# Patient Record
Sex: FEMALE | Race: WHITE | NOT HISPANIC OR LATINO | Employment: UNEMPLOYED | URBAN - METROPOLITAN AREA
[De-identification: names, ages, dates, MRNs, and addresses within clinical notes are randomized per-mention and may not be internally consistent; named-entity substitution may affect disease eponyms.]

---

## 2024-11-06 ENCOUNTER — HOSPITAL ENCOUNTER (OUTPATIENT)
Dept: PHYSICAL THERAPY | Facility: HOSPITAL | Age: 34
Setting detail: THERAPIES SERIES
Discharge: HOME | End: 2024-11-06
Payer: MEDICAID

## 2024-11-06 ENCOUNTER — TRANSCRIBE ORDERS (OUTPATIENT)
Dept: PHYSICAL THERAPY | Facility: HOSPITAL | Age: 34
End: 2024-11-06
Payer: MEDICAID

## 2024-11-06 ENCOUNTER — PATIENT OUTREACH (OUTPATIENT)
Dept: ONCOLOGY | Facility: CLINIC | Age: 34
End: 2024-11-06
Payer: MEDICAID

## 2024-11-06 DIAGNOSIS — Z17.0 MALIGNANT NEOPLASM OF LEFT BREAST IN FEMALE, ESTROGEN RECEPTOR POSITIVE, UNSPECIFIED SITE OF BREAST: Primary | ICD-10-CM

## 2024-11-06 DIAGNOSIS — C50.912 MALIGNANT NEOPLASM OF LEFT BREAST IN FEMALE, ESTROGEN RECEPTOR POSITIVE, UNSPECIFIED SITE OF BREAST: Primary | ICD-10-CM

## 2024-11-06 DIAGNOSIS — C50.912 MALIGNANT NEOPLASM OF LEFT FEMALE BREAST, UNSPECIFIED ESTROGEN RECEPTOR STATUS, UNSPECIFIED SITE OF BREAST: Primary | ICD-10-CM

## 2024-11-06 PROCEDURE — 93702 BIS XTRACELL FLUID ANALYSIS: CPT

## 2024-11-06 PROCEDURE — 97162 PT EVAL MOD COMPLEX 30 MIN: CPT

## 2024-11-06 NOTE — THERAPY DISCHARGE NOTE
Outpatient Physical Therapy Lymphedema Initial Evaluation & Discharge  [facility]     Patient Name: Eliane Argueta  : 1990  MRN: [id number]  Today's Date: 2024      Visit Date: 2024    Visit Dx:    ICD-10-CM ICD-9-CM   1. Malignant neoplasm of left female breast, unspecified estrogen receptor status, unspecified site of breast  C50.912 174.9       There is no problem list on file for this patient.       No past medical history on file.     No past surgical history on file.         Lymphedema       Row Name 24 1200             Subjective Pain    Able to rate subjective pain? yes  -LF      Pre-Treatment Pain Level 3  -LF      Post-Treatment Pain Level 3  -LF         Lymphedema Assessment    Lymphedema Classification LUE:;at risk/stage 0  -LF      Lymphedema Surgery Comments surgery is TBD  -LF         General ROM    RT Upper Ext Rt Shoulder ABduction;Rt Shoulder Flexion;Rt Shoulder External Rotation;Rt Shoulder Internal Rotation  -LF      LT Upper Ext Lt Shoulder Flexion;Lt Shoulder External Rotation;Lt Shoulder Internal Rotation;Lt Shoulder ABduction  -LF      GENERAL ROM COMMENTS WFL wrist/hand  -LF         Right Upper Ext    Rt Shoulder Abduction AROM WFL  -LF      Rt Shoulder Flexion AROM WFL  -LF      Rt Shoulder External Rotation AROM WFL  -LF      Rt Shoulder Internal Rotation AROM WFL  -LF         Left Upper Ext    Lt Shoulder Abduction AROM WFL  -LF      Lt Shoulder Flexion AROM WFL  -LF      Lt Shoulder External Rotation AROM WFL  -LF      Lt Shoulder Internal Rotation AROM WFL  -LF      Lt Upper Extremity Comments  ~5 degree limitation shoulder flex compared to RUE - reports soreness at axilla from recent exams/palpation  -LF         MMT (Manual Muscle Testing)    Rt Upper Ext Rt Shoulder Flexion;Rt Shoulder ABduction;Rt Shoulder Internal Rotation;Rt Shoulder External Rotation  -LF      Lt Upper Ext Lt Shoulder Flexion;Lt Shoulder ABduction;Lt Shoulder Internal Rotation;Lt  Shoulder External Rotation  -LF         MMT Right Upper Ext    Rt Shoulder Flexion MMT, Gross Movement (4/5) good  -LF      Rt Shoulder ABduction MMT, Gross Movement (4/5) good  -LF      Rt Shoulder Internal Rotation MMT, Gross Movement (4/5) good  -LF      Rt Shoulder External Rotation MMT, Gross Movement (4/5) good  -LF         MMT Left Upper Ext    Lt Shoulder Flexion MMT, Gross Movement (4/5) good  -LF      Lt Shoulder ABduction MMT, Gross Movement (4/5) good  -LF      Lt Shoulder Internal Rotation MMT, Gross Movement (4/5) good  -LF      Lt Shoulder External Rotation MMT, Gross Movement (4/5) good  -LF         Hand  Strength     Strength Affected Side Bilateral  -LF          Strength Right    # Reps 1  -LF      Right Rung 2  -LF      Right  Test 1 15  -LF       Strength Average Right 15  -LF          Strength Left    # Reps 1  -LF      Left Rung 2  -LF      Left  Test 1 5  -LF       Strength Average Left 5  -LF         Lymphedema Edema Assessment    Edema Assessment Comment No edema present at this time.  -LF         Skin Changes/Observations    Location/Assessment Upper Extremity  -LF      Upper Extremity Conditions bilateral:;normal;intact  -LF      Upper Extremity Color/Pigment bilateral:;normal  -LF         Lymphedema Sensation    Lymphedema Sensation Reports RUE:;LUE:  -LF      Lymphedema Sensation Tests light touch  -LF      Lymphedema Light Touch RUE:;LUE:;WNL  -LF         L-Dex Bioimpedence Screening    L-Dex Measurement Extremity RUE;LUE  -LF      L-Dex Patient Position Standing  -LF      L-Dex UE Dominate Side Right  -LF      L-Dex UE At Risk Side Left  -LF      L-Dex UE Pre Surgical Value Yes  -LF      L-Dex UE Score -3.9  -LF      L-Dex UE Comment The patient had SOZO measurement which I reviewed today. The score is in normal limits, see scanned to EMR. Bioimpedance spectroscopy helps identify the onset of lymphedema in an arm or leg before patients experience  noticeable swelling. Research has shown that the early detection of lymphedema using L-Dex combined with treatment can reduce progression to chronic lymphedema by 95% in breast cancer patients. Whenever possible, patients are tested for baseline L-Dex score before cancer treatment begins and then are reassessed during regular follow-up visits using the SOZO device. Otherwise, this can be started postoperatively and continued during regular follow-up visits. If the patient’s L-Dex score increases above normal levels, that is a sign that lymphedema is developing and a referral is made to occupational or physical therapy for further evaluation and early compression treatment. Lymphedema assessment with the SOZO L-Dex score is recommended to be done every 3 months for the first 3 years and then every 6 months for years 4 and 5 followed by annually afterwards.  -LF                User Key  (r) = Recorded By, (t) = Taken By, (c) = Cosigned By      Initials Name Provider Type    Mercedes Webb PT Physical Therapist                        Therapy Education  Education Details: Pt educated on prehab evaluation assessments including bioimpedance. Pt was provided an HEP detailing information including lymphedema, risk reduction, and post op exercise.  Given: HEP, Symptoms/condition management, Posture/body mechanics  Program: New  How Provided: Verbal, Written  Provided to: Patient, Other (comment) (SO)  Level of Understanding: Verbalized     OP Exercises       Row Name 11/06/24 1200             Subjective    Subjective Comments Patient presents with recent diagnosis of L breast cancer.  Anticipated plan for surgery is bilateral mastectomy w/ SLNB and delayed reconstruction.  She has some chronic pain (mostly shoulder, neck, psine) due to RA.  She has 14 and 15 y/o children.  Enjoys reading.  -LF         Subjective Pain    Able to rate subjective pain? yes  -LF      Pre-Treatment Pain Level 3  -LF      Post-Treatment  Pain Level 3  -LF                User Key  (r) = Recorded By, (t) = Taken By, (c) = Cosigned By      Initials Name Provider Type    Mercedes Webb PT Physical Therapist                       PT OP Goals       Row Name 11/06/24 1200          Long Term Goals    LTG Date to Achieve 11/06/24  -LF     LTG 1 Pt demonstrates awareness of post-operative movement restrictions and HEP to facilitate lymphatic regeneration and reduce the risk of seroma formation, axillary web syndrome, and lymphedema while ensuring shoulder joint mobility.  -LF     LTG 1 Progress Met  -LF     LTG 2 Pt demonstrates understanding of post-operative basic lymphedema precautions  -LF     LTG 2 Progress Met  -LF        Time Calculation    PT Goal Re-Cert Due Date 11/06/24  -LF               User Key  (r) = Recorded By, (t) = Taken By, (c) = Cosigned By      Initials Name Provider Type    Merecdes Webb, PT Physical Therapist                     PT Assessment/Plan       Row Name 11/06/24 1200          PT Assessment    Assessment Comments This patient presents to PT pre-operatively for planned BrCA surgery scheduled in a couple weeks. Baseline ROM, postural, and bioimpedance measurements were taken today to be compared to measurements retaken 3-4 weeks post surgery. At that time, any reduced movement, decline in function, or postural issues will be addressed with skilled care and new goals will be established.  Personal risk factors for lymphedema post-operatively for the left upper extremity and trunk quadrant were also assessed today and basic lymphedema precautions were discussed. A more detailed discussion regarding personal lymphedema risk factors can take place post-operatively once the number of lymph nodes removed and the plan for further medical care is known.  -LF     Please refer to paper survey for additional self-reported information Yes  -LF     Rehab Potential Good  -LF     Patient/caregiver participated in establishment of  treatment plan and goals Yes  -LF     Patient would benefit from skilled therapy intervention Yes  -LF        PT Plan    PT Frequency One time visit  -     Planned CPT's? PT EVAL MOD COMPLELITY: 30931;PT BIS XTRACELL FLUID ANALYSIS: 91745  -     PT Plan Comments This patient may return to PT 3-4 weeks post operatively for re-evaluation measurements to be compared to measurements taken today, at her pre-operative evaluation. In addition, she can be examined for possible post-BrCA surgery sequelae such as axillary web syndrome, scar adhesions, edema, worsened posture, scapular winging, pain, and reduced ROM and function. At that time, a future plan and goals will be established and skilled care continued if indicated. Currently, she has been provided with information before BrCA surgery in order to facilitate recovery and reduce the risk of post-operative sequelae.  -               User Key  (r) = Recorded By, (t) = Taken By, (c) = Cosigned By      Initials Name Provider Type     Mercedes Juarez, PT Physical Therapist                     Outcome Measure Options: Quick DASH, Timed Up and Go (TUG)  Quick DASH  Open a tight or new jar.: Severe Difficulty  Do heavy household chores (e.g., wash walls, wash floors): Mild Difficulty  Carry a shopping bag or briefcase: Moderate Difficulty  Wash your back: Mild Difficulty  Use a knife to cut food: Mild Difficulty  Recreational activities in which you take some force or impact through your arm, should or hand (e.g. golf, hammering, tennis, etc.): Moderate Difficulty  During the past week, to what extent has your arm, shoulder, or hand problem interfered with your normal social activites with family, friends, neighbors or groups?: Moderately  During the past week, were you limited in your work or other regular daily activities as a result of your arm, shoulder or hand problem?: Slightly Limited  Arm, Shoulder, or hand pain: Moderate  Tingling (pins and needles) in your  arm, shoulder, or hand: Mild  During the past week, how much difficulty have you had sleeping because of the pain in your arm, shoulder or hand?: Moderate Difficiculty  Number of Questions Answered: 11  Quick DASH Score: 40.91  Timed Up and Go (TUG)  TUG Test 1: 9 seconds      Time Calculation:   Start Time: 1200  Untimed Charges  PT Eval/Re-eval Minutes: 45  Total Minutes  Untimed Charges Total Minutes: 45   Total Minutes: 45     Therapy Charges for Today       Code Description Service Date Service Provider Modifiers Qty    65720529329 HC PT BIS XTRACELL FLUID ANALYSIS 11/6/2024 Mercedes Juarez, PT  1    [id number] HC PT EVAL MOD COMPLEXITY 4 11/6/2024 Mercedes Juarez, PT GP 1            PT G-Codes  Outcome Measure Options: Quick DASH, Timed Up and Go (TUG)  Quick DASH Score: 40.91  TUG Test 1: 9 seconds            Mercedes Juarez, PT  11/6/2024

## 2024-11-07 ENCOUNTER — TRANSCRIBE ORDERS (OUTPATIENT)
Dept: ADMINISTRATIVE | Facility: HOSPITAL | Age: 34
End: 2024-11-07
Payer: MEDICAID

## 2024-11-07 DIAGNOSIS — C50.212 MALIGNANT NEOPLASM OF UPPER-INNER QUADRANT OF LEFT FEMALE BREAST, UNSPECIFIED ESTROGEN RECEPTOR STATUS: Primary | ICD-10-CM

## 2024-11-08 ENCOUNTER — PRE-ADMISSION TESTING (OUTPATIENT)
Dept: PREADMISSION TESTING | Facility: HOSPITAL | Age: 34
End: 2024-11-08
Payer: MEDICAID

## 2024-11-08 VITALS — HEIGHT: 67 IN | BODY MASS INDEX: 31.25 KG/M2 | WEIGHT: 199.08 LBS

## 2024-11-08 LAB
ALBUMIN SERPL-MCNC: 4.4 G/DL (ref 3.5–5.2)
ALBUMIN/GLOB SERPL: 1.6 G/DL
ALP SERPL-CCNC: 72 U/L (ref 39–117)
ALT SERPL W P-5'-P-CCNC: 13 U/L (ref 1–33)
ANION GAP SERPL CALCULATED.3IONS-SCNC: 11 MMOL/L (ref 5–15)
AST SERPL-CCNC: 16 U/L (ref 1–32)
BILIRUB SERPL-MCNC: 0.3 MG/DL (ref 0–1.2)
BUN SERPL-MCNC: 10 MG/DL (ref 6–20)
BUN/CREAT SERPL: 11.8 (ref 7–25)
CALCIUM SPEC-SCNC: 9.4 MG/DL (ref 8.6–10.5)
CHLORIDE SERPL-SCNC: 102 MMOL/L (ref 98–107)
CO2 SERPL-SCNC: 24 MMOL/L (ref 22–29)
CREAT SERPL-MCNC: 0.85 MG/DL (ref 0.57–1)
DEPRECATED RDW RBC AUTO: 40 FL (ref 37–54)
EGFRCR SERPLBLD CKD-EPI 2021: 91.8 ML/MIN/1.73
ERYTHROCYTE [DISTWIDTH] IN BLOOD BY AUTOMATED COUNT: 12.2 % (ref 12.3–15.4)
GLOBULIN UR ELPH-MCNC: 2.7 GM/DL
GLUCOSE SERPL-MCNC: 83 MG/DL (ref 65–99)
HBA1C MFR BLD: 5.2 % (ref 4.8–5.6)
HCT VFR BLD AUTO: 36.6 % (ref 34–46.6)
HGB BLD-MCNC: 12.3 G/DL (ref 12–15.9)
MCH RBC QN AUTO: 30.1 PG (ref 26.6–33)
MCHC RBC AUTO-ENTMCNC: 33.6 G/DL (ref 31.5–35.7)
MCV RBC AUTO: 89.5 FL (ref 79–97)
PLATELET # BLD AUTO: 277 10*3/MM3 (ref 140–450)
PMV BLD AUTO: 9.2 FL (ref 6–12)
POTASSIUM SERPL-SCNC: 4 MMOL/L (ref 3.5–5.2)
PROT SERPL-MCNC: 7.1 G/DL (ref 6–8.5)
RBC # BLD AUTO: 4.09 10*6/MM3 (ref 3.77–5.28)
SODIUM SERPL-SCNC: 137 MMOL/L (ref 136–145)
WBC NRBC COR # BLD AUTO: 7.02 10*3/MM3 (ref 3.4–10.8)

## 2024-11-08 PROCEDURE — 36415 COLL VENOUS BLD VENIPUNCTURE: CPT

## 2024-11-08 PROCEDURE — 85027 COMPLETE CBC AUTOMATED: CPT

## 2024-11-08 PROCEDURE — 80053 COMPREHEN METABOLIC PANEL: CPT

## 2024-11-08 PROCEDURE — 83036 HEMOGLOBIN GLYCOSYLATED A1C: CPT

## 2024-11-08 RX ORDER — NAPROXEN SODIUM 550 MG/1
TABLET ORAL
COMMUNITY
Start: 2024-09-06

## 2024-11-08 RX ORDER — DIAZEPAM 5 MG/1
TABLET ORAL
COMMUNITY

## 2024-11-08 NOTE — PROGRESS NOTES
I saw patient with Dr MCKAY and patient's . Dr MCKAY reviewed the pathology report and surgical/treatment options- The patient prefers bilateal mastoidectomies with Delayed reconstruction due to currently vaping. Educational and supportive materials were given and reviewed with patient - notes taken for and given to patient. The patient had genetic testing which was negative

## 2024-11-08 NOTE — PAT
An arrival time for procedure was not provided during PAT visit. If patient had any questions or concerns about their arrival time, they were instructed to contact their surgeon/physician.  Additionally, if the patient referred to an arrival time that was acquired from their my chart account, patient was encouraged to verify that time with their surgeon/physician. Arrival times are NOT provided in Pre Admission Testing Department.    Patient instructed to drink 20 ounces of Gatorade or Gatorlyte (if diabetic) and it needs to be completed 1 hour (for Main OR patients) or 2 hours (scheduled  section & [facility] patients) before given arrival time for procedure (NO RED Gatorade and NO Gatorade Zero).    Patient verbalized understanding.    Patient denies any current skin issues.     Patient to apply Chlorhexadine wipes  to surgical area (as instructed) the night before procedure and the AM of procedure. Wipes provided.    Patient viewed general PAT education video as instructed in their preoperative information received from their surgeon.  Patient stated the general PAT education video was viewed in its entirety and survey completed.  Copies of PAT general education handouts (Incentive Spirometry, Meds to Beds Program, Patient Belongings, Pre-op skin preparation instructions, Blood Glucose testing, Visitor policy, Surgery FAQ, Code H) distributed to patient if not printed. Education related to the PAT pass and skin preparation for surgery (if applicable) completed in PAT as a reinforcement to PAT education video. Patient instructed to return PAT pass provided today as well as completed skin preparation sheet (if applicable) on the day of procedure.     Additionally if patient had not viewed video yet but intended to view it at home or in our waiting area, then referred them to the handout with QR code/link provided during PAT visit.  Encouraged patient/family to read PAT general education handouts thoroughly and  notify PAT staff with any questions or concerns. Patient verbalized understanding of all information and priority content.

## 2024-11-11 ENCOUNTER — ANESTHESIA EVENT (OUTPATIENT)
Dept: PERIOP | Facility: HOSPITAL | Age: 34
End: 2024-11-11
Payer: MEDICAID

## 2024-11-11 ENCOUNTER — DOCUMENTATION (OUTPATIENT)
Dept: OTHER | Facility: HOSPITAL | Age: 34
End: 2024-11-11
Payer: MEDICAID

## 2024-11-11 RX ORDER — FAMOTIDINE 10 MG/ML
20 INJECTION, SOLUTION INTRAVENOUS ONCE
Status: CANCELLED | OUTPATIENT
Start: 2024-11-11 | End: 2024-11-11

## 2024-11-11 NOTE — PROGRESS NOTES
Distress Screening Follow-up    Name: Eliane Argueta    : 1990    Diagnosis: Breast Cancer    Location of Distress Screening: Breast Surgery     Distress Level: 8 (4 after consult with Dr. MCKAY 24) (2024 12:00 PM)    Physical Concerns:  Sleep: Y  Fatigue: Y  Memory or concentration: Y  Pain: Y    Emotional Concerns:  Worry or anxiety: Y  Sadness or depression: Y  Loss of interest or enjoyment: Y  Fear: Y  Anger: Y  Feelings of worthlessness or being a burden: Y    Social Concerns:  Relationship with spouse or partner: Y  Relationship with children: Y    Practical Concerns:  Taking care of others: Y  Work: Y  Housing: Y  Treatment decisions: Y     Interventions:   Practical Needs: Nurse Navigator Referral; Social Work Referral    Comments:  MICHAELA contacted pt to follow up on Distress Screen from recent visit. MICHAELA provided introductions and explained nature of the call. Pt communicated she is doing okay, and explained at this time they are just waiting to complete procedure tomorrow. Pt expressed some distress comes with fear of unknown after surgery. MICHAELA normalized her emotions and educated pt on role and resources. Pt thanked MICHAELA for the call, and stated she will reach out after surgery should needs arise. SW will be available ongoing.

## 2024-11-12 ENCOUNTER — ANESTHESIA (OUTPATIENT)
Dept: PERIOP | Facility: HOSPITAL | Age: 34
End: 2024-11-12
Payer: MEDICAID

## 2024-11-12 ENCOUNTER — HOSPITAL ENCOUNTER (OUTPATIENT)
Facility: HOSPITAL | Age: 34
Discharge: HOME | End: 2024-11-13
Attending: SURGERY | Admitting: SURGERY
Payer: MEDICAID

## 2024-11-12 ENCOUNTER — HOSPITAL ENCOUNTER (OUTPATIENT)
Dept: NUCLEAR MEDICINE | Facility: HOSPITAL | Age: 34
Discharge: HOME | End: 2024-11-12
Payer: MEDICAID

## 2024-11-12 ENCOUNTER — ANESTHESIA EVENT CONVERTED (OUTPATIENT)
Dept: ANESTHESIOLOGY | Facility: HOSPITAL | Age: 34
End: 2024-11-12
Payer: MEDICAID

## 2024-11-12 DIAGNOSIS — C50.212 MALIGNANT NEOPLASM OF UPPER-INNER QUADRANT OF LEFT FEMALE BREAST: Primary | ICD-10-CM

## 2024-11-12 DIAGNOSIS — C50.212 MALIGNANT NEOPLASM OF UPPER-INNER QUADRANT OF LEFT FEMALE BREAST, UNSPECIFIED ESTROGEN RECEPTOR STATUS: ICD-10-CM

## 2024-11-12 LAB
B-HCG UR QL: NEGATIVE
EXPIRATION DATE: NORMAL
INTERNAL NEGATIVE CONTROL: NEGATIVE
INTERNAL POSITIVE CONTROL: POSITIVE
Lab: NORMAL

## 2024-11-12 PROCEDURE — A9541 TC99M SULFUR COLLOID: HCPCS | Performed by: SURGERY

## 2024-11-12 PROCEDURE — 81025 URINE PREGNANCY TEST: CPT | Performed by: ANESTHESIOLOGY

## 2024-11-12 PROCEDURE — 25010000002 FENTANYL CITRATE (PF) 50 MCG/ML SOLUTION

## 2024-11-12 PROCEDURE — 25010000002 LIDOCAINE PF 1% 1 % SOLUTION: Performed by: ANESTHESIOLOGY

## 2024-11-12 PROCEDURE — 38792 RA TRACER ID OF SENTINL NODE: CPT

## 2024-11-12 PROCEDURE — 25010000002 FENTANYL CITRATE (PF) 100 MCG/2ML SOLUTION: Performed by: ANESTHESIOLOGY

## 2024-11-12 PROCEDURE — 25010000002 BUPIVACAINE (PF) 0.25 % SOLUTION: Performed by: ANESTHESIOLOGY

## 2024-11-12 PROCEDURE — 25010000002 HYDROMORPHONE 1 MG/ML SOLUTION

## 2024-11-12 PROCEDURE — 25010000002 ONDANSETRON PER 1 MG: Performed by: NURSE ANESTHETIST, CERTIFIED REGISTERED

## 2024-11-12 PROCEDURE — 25010000002 MIDAZOLAM PER 1 MG: Performed by: ANESTHESIOLOGY

## 2024-11-12 PROCEDURE — 25010000002 CEFAZOLIN PER 500 MG: Performed by: SURGERY

## 2024-11-12 PROCEDURE — 25010000002 DEXAMETHASONE SODIUM PHOSPHATE 10 MG/ML SOLUTION: Performed by: ANESTHESIOLOGY

## 2024-11-12 PROCEDURE — 88331 PATH CONSLTJ SURG 1 BLK 1SPC: CPT | Performed by: SURGERY

## 2024-11-12 PROCEDURE — 88342 IMHCHEM/IMCYTCHM 1ST ANTB: CPT | Performed by: SURGERY

## 2024-11-12 PROCEDURE — 25010000002 NEOSTIGMINE 10 MG/10ML SOLUTION: Performed by: NURSE ANESTHETIST, CERTIFIED REGISTERED

## 2024-11-12 PROCEDURE — 34310000005 TECHNETIUM FILTERED SULFUR COLLOID: Performed by: SURGERY

## 2024-11-12 PROCEDURE — 25010000002 GLYCOPYRROLATE 1 MG/5ML SOLUTION: Performed by: NURSE ANESTHETIST, CERTIFIED REGISTERED

## 2024-11-12 PROCEDURE — 25010000002 ONDANSETRON PER 1 MG

## 2024-11-12 PROCEDURE — 25010000002 HYDROMORPHONE 1 MG/ML SOLUTION: Performed by: SURGERY

## 2024-11-12 PROCEDURE — 25810000003 LACTATED RINGERS PER 1000 ML: Performed by: ANESTHESIOLOGY

## 2024-11-12 PROCEDURE — 88307 TISSUE EXAM BY PATHOLOGIST: CPT | Performed by: SURGERY

## 2024-11-12 PROCEDURE — 25010000002 PROPOFOL 10 MG/ML EMULSION: Performed by: ANESTHESIOLOGY

## 2024-11-12 RX ORDER — DIAZEPAM 2 MG/1
2 TABLET ORAL EVERY 8 HOURS PRN
Status: DISCONTINUED | OUTPATIENT
Start: 2024-11-12 | End: 2024-11-13 | Stop reason: HOSPADM

## 2024-11-12 RX ORDER — SODIUM CHLORIDE 0.9 % (FLUSH) 0.9 %
10 SYRINGE (ML) INJECTION EVERY 12 HOURS SCHEDULED
Status: DISCONTINUED | OUTPATIENT
Start: 2024-11-12 | End: 2024-11-12 | Stop reason: HOSPADM

## 2024-11-12 RX ORDER — PREGABALIN 75 MG/1
75 CAPSULE ORAL ONCE
Status: COMPLETED | OUTPATIENT
Start: 2024-11-12 | End: 2024-11-12

## 2024-11-12 RX ORDER — LORAZEPAM 0.5 MG/1
0.5 TABLET ORAL EVERY 8 HOURS PRN
Status: DISCONTINUED | OUTPATIENT
Start: 2024-11-12 | End: 2024-11-13 | Stop reason: HOSPADM

## 2024-11-12 RX ORDER — SODIUM CHLORIDE 0.9 % (FLUSH) 0.9 %
10 SYRINGE (ML) INJECTION AS NEEDED
Status: DISCONTINUED | OUTPATIENT
Start: 2024-11-12 | End: 2024-11-12 | Stop reason: HOSPADM

## 2024-11-12 RX ORDER — GLYCOPYRROLATE 0.2 MG/ML
INJECTION INTRAMUSCULAR; INTRAVENOUS AS NEEDED
Status: DISCONTINUED | OUTPATIENT
Start: 2024-11-12 | End: 2024-11-12 | Stop reason: SURG

## 2024-11-12 RX ORDER — FENTANYL CITRATE 50 UG/ML
INJECTION, SOLUTION INTRAMUSCULAR; INTRAVENOUS
Status: COMPLETED
Start: 2024-11-12 | End: 2024-11-12

## 2024-11-12 RX ORDER — FENTANYL CITRATE 50 UG/ML
INJECTION, SOLUTION INTRAMUSCULAR; INTRAVENOUS AS NEEDED
Status: DISCONTINUED | OUTPATIENT
Start: 2024-11-12 | End: 2024-11-12 | Stop reason: SURG

## 2024-11-12 RX ORDER — NALOXONE HCL 0.4 MG/ML
0.1 VIAL (ML) INJECTION
Status: DISCONTINUED | OUTPATIENT
Start: 2024-11-12 | End: 2024-11-13 | Stop reason: HOSPADM

## 2024-11-12 RX ORDER — DROPERIDOL 2.5 MG/ML
0.62 INJECTION, SOLUTION INTRAMUSCULAR; INTRAVENOUS ONCE AS NEEDED
Status: DISCONTINUED | OUTPATIENT
Start: 2024-11-12 | End: 2024-11-12 | Stop reason: HOSPADM

## 2024-11-12 RX ORDER — BUPIVACAINE HYDROCHLORIDE 2.5 MG/ML
INJECTION, SOLUTION EPIDURAL; INFILTRATION; INTRACAUDAL
Status: COMPLETED | OUTPATIENT
Start: 2024-11-12 | End: 2024-11-12

## 2024-11-12 RX ORDER — FENTANYL CITRATE 50 UG/ML
50 INJECTION, SOLUTION INTRAMUSCULAR; INTRAVENOUS
Status: DISCONTINUED | OUTPATIENT
Start: 2024-11-12 | End: 2024-11-12 | Stop reason: HOSPADM

## 2024-11-12 RX ORDER — PROPOFOL 10 MG/ML
VIAL (ML) INTRAVENOUS AS NEEDED
Status: DISCONTINUED | OUTPATIENT
Start: 2024-11-12 | End: 2024-11-12 | Stop reason: SURG

## 2024-11-12 RX ORDER — LIDOCAINE HYDROCHLORIDE 10 MG/ML
0.5 INJECTION, SOLUTION EPIDURAL; INFILTRATION; INTRACAUDAL; PERINEURAL ONCE AS NEEDED
Status: DISCONTINUED | OUTPATIENT
Start: 2024-11-12 | End: 2024-11-12 | Stop reason: HOSPADM

## 2024-11-12 RX ORDER — NEOSTIGMINE METHYLSULFATE 1 MG/ML
INJECTION INTRAVENOUS AS NEEDED
Status: DISCONTINUED | OUTPATIENT
Start: 2024-11-12 | End: 2024-11-12 | Stop reason: SURG

## 2024-11-12 RX ORDER — OXYCODONE HYDROCHLORIDE 5 MG/1
5 TABLET ORAL EVERY 4 HOURS PRN
Status: DISCONTINUED | OUTPATIENT
Start: 2024-11-12 | End: 2024-11-13 | Stop reason: HOSPADM

## 2024-11-12 RX ORDER — ONDANSETRON 2 MG/ML
INJECTION INTRAMUSCULAR; INTRAVENOUS
Status: COMPLETED
Start: 2024-11-12 | End: 2024-11-12

## 2024-11-12 RX ORDER — SCOLOPAMINE TRANSDERMAL SYSTEM 1 MG/1
1 PATCH, EXTENDED RELEASE TRANSDERMAL ONCE
Status: DISCONTINUED | OUTPATIENT
Start: 2024-11-12 | End: 2024-11-13 | Stop reason: HOSPADM

## 2024-11-12 RX ORDER — PROMETHAZINE HYDROCHLORIDE 12.5 MG/1
6.25 SUPPOSITORY RECTAL EVERY 6 HOURS PRN
Status: DISCONTINUED | OUTPATIENT
Start: 2024-11-12 | End: 2024-11-13 | Stop reason: HOSPADM

## 2024-11-12 RX ORDER — MELOXICAM 15 MG/1
15 TABLET ORAL DAILY
Status: DISCONTINUED | OUTPATIENT
Start: 2024-11-13 | End: 2024-11-13 | Stop reason: HOSPADM

## 2024-11-12 RX ORDER — ONDANSETRON 2 MG/ML
4 INJECTION INTRAMUSCULAR; INTRAVENOUS ONCE AS NEEDED
Status: COMPLETED | OUTPATIENT
Start: 2024-11-12 | End: 2024-11-12

## 2024-11-12 RX ORDER — ACETAMINOPHEN 500 MG
1000 TABLET ORAL EVERY 6 HOURS
Status: DISCONTINUED | OUTPATIENT
Start: 2024-11-12 | End: 2024-11-13 | Stop reason: HOSPADM

## 2024-11-12 RX ORDER — LIDOCAINE HYDROCHLORIDE 10 MG/ML
INJECTION, SOLUTION EPIDURAL; INFILTRATION; INTRACAUDAL; PERINEURAL AS NEEDED
Status: DISCONTINUED | OUTPATIENT
Start: 2024-11-12 | End: 2024-11-12 | Stop reason: SURG

## 2024-11-12 RX ORDER — DEXAMETHASONE SODIUM PHOSPHATE 10 MG/ML
INJECTION, SOLUTION INTRAMUSCULAR; INTRAVENOUS AS NEEDED
Status: DISCONTINUED | OUTPATIENT
Start: 2024-11-12 | End: 2024-11-12

## 2024-11-12 RX ORDER — DIPHENHYDRAMINE HYDROCHLORIDE 50 MG/ML
12.5 INJECTION INTRAMUSCULAR; INTRAVENOUS EVERY 6 HOURS PRN
Status: DISCONTINUED | OUTPATIENT
Start: 2024-11-12 | End: 2024-11-13 | Stop reason: HOSPADM

## 2024-11-12 RX ORDER — ONDANSETRON 2 MG/ML
4 INJECTION INTRAMUSCULAR; INTRAVENOUS EVERY 6 HOURS PRN
Status: DISCONTINUED | OUTPATIENT
Start: 2024-11-12 | End: 2024-11-13 | Stop reason: HOSPADM

## 2024-11-12 RX ORDER — ACETAMINOPHEN 500 MG
1000 TABLET ORAL ONCE
Status: COMPLETED | OUTPATIENT
Start: 2024-11-12 | End: 2024-11-12

## 2024-11-12 RX ORDER — ROCURONIUM BROMIDE 10 MG/ML
INJECTION, SOLUTION INTRAVENOUS AS NEEDED
Status: DISCONTINUED | OUTPATIENT
Start: 2024-11-12 | End: 2024-11-12 | Stop reason: SURG

## 2024-11-12 RX ORDER — FAMOTIDINE 20 MG/1
20 TABLET, FILM COATED ORAL ONCE
Status: COMPLETED | OUTPATIENT
Start: 2024-11-12 | End: 2024-11-12

## 2024-11-12 RX ORDER — SODIUM CHLORIDE 9 MG/ML
50 INJECTION, SOLUTION INTRAVENOUS CONTINUOUS
Status: ACTIVE | OUTPATIENT
Start: 2024-11-12 | End: 2024-11-12

## 2024-11-12 RX ORDER — MIDAZOLAM HYDROCHLORIDE 1 MG/ML
1 INJECTION, SOLUTION INTRAMUSCULAR; INTRAVENOUS
Status: DISCONTINUED | OUTPATIENT
Start: 2024-11-12 | End: 2024-11-12 | Stop reason: HOSPADM

## 2024-11-12 RX ORDER — PROMETHAZINE HYDROCHLORIDE 12.5 MG/1
6.25 TABLET ORAL EVERY 6 HOURS PRN
Status: DISCONTINUED | OUTPATIENT
Start: 2024-11-12 | End: 2024-11-13 | Stop reason: HOSPADM

## 2024-11-12 RX ORDER — SODIUM CHLORIDE, SODIUM LACTATE, POTASSIUM CHLORIDE, CALCIUM CHLORIDE 600; 310; 30; 20 MG/100ML; MG/100ML; MG/100ML; MG/100ML
9 INJECTION, SOLUTION INTRAVENOUS CONTINUOUS
Status: ACTIVE | OUTPATIENT
Start: 2024-11-13 | End: 2024-11-13

## 2024-11-12 RX ORDER — MELOXICAM 15 MG/1
15 TABLET ORAL ONCE
Status: COMPLETED | OUTPATIENT
Start: 2024-11-12 | End: 2024-11-12

## 2024-11-12 RX ORDER — ONDANSETRON 2 MG/ML
INJECTION INTRAMUSCULAR; INTRAVENOUS AS NEEDED
Status: DISCONTINUED | OUTPATIENT
Start: 2024-11-12 | End: 2024-11-12 | Stop reason: SURG

## 2024-11-12 RX ORDER — HYDROMORPHONE HYDROCHLORIDE 1 MG/ML
0.5 INJECTION, SOLUTION INTRAMUSCULAR; INTRAVENOUS; SUBCUTANEOUS
Status: DISCONTINUED | OUTPATIENT
Start: 2024-11-12 | End: 2024-11-12 | Stop reason: HOSPADM

## 2024-11-12 RX ORDER — DEXAMETHASONE SODIUM PHOSPHATE 10 MG/ML
INJECTION, SOLUTION INTRAMUSCULAR; INTRAVENOUS
Status: COMPLETED | OUTPATIENT
Start: 2024-11-12 | End: 2024-11-12

## 2024-11-12 RX ADMIN — SODIUM CHLORIDE 2000 MG: 900 INJECTION INTRAVENOUS at 17:48

## 2024-11-12 RX ADMIN — FENTANYL CITRATE 100 MCG: 50 INJECTION, SOLUTION INTRAMUSCULAR; INTRAVENOUS at 07:30

## 2024-11-12 RX ADMIN — BUPIVACAINE HYDROCHLORIDE 60 ML: 2.5 INJECTION, SOLUTION EPIDURAL; INFILTRATION; INTRACAUDAL; PERINEURAL at 07:35

## 2024-11-12 RX ADMIN — OXYCODONE 5 MG: 5 TABLET ORAL at 21:30

## 2024-11-12 RX ADMIN — FENTANYL CITRATE 50 MCG: 50 INJECTION, SOLUTION INTRAMUSCULAR; INTRAVENOUS at 10:46

## 2024-11-12 RX ADMIN — OXYCODONE 5 MG: 5 TABLET ORAL at 17:07

## 2024-11-12 RX ADMIN — SCOPOLAMINE 1 PATCH: 1.5 PATCH, EXTENDED RELEASE TRANSDERMAL at 06:45

## 2024-11-12 RX ADMIN — TECHNETIUM TC 99M SULFUR COLLOID 1 DOSE: KIT at 07:35

## 2024-11-12 RX ADMIN — HYDROMORPHONE HYDROCHLORIDE 0.5 MG: 1 INJECTION, SOLUTION INTRAMUSCULAR; INTRAVENOUS; SUBCUTANEOUS at 11:23

## 2024-11-12 RX ADMIN — GLYCOPYRROLATE 0.6 MG: 0.2 INJECTION INTRAMUSCULAR; INTRAVENOUS at 10:13

## 2024-11-12 RX ADMIN — DEXAMETHASONE SODIUM PHOSPHATE 6 MG: 10 INJECTION, SOLUTION INTRAMUSCULAR; INTRAVENOUS at 07:40

## 2024-11-12 RX ADMIN — LIDOCAINE HYDROCHLORIDE 50 MG: 10 INJECTION, SOLUTION EPIDURAL; INFILTRATION; INTRACAUDAL; PERINEURAL at 07:33

## 2024-11-12 RX ADMIN — ONDANSETRON 4 MG: 2 INJECTION INTRAMUSCULAR; INTRAVENOUS at 10:51

## 2024-11-12 RX ADMIN — FENTANYL CITRATE 50 MCG: 50 INJECTION, SOLUTION INTRAMUSCULAR; INTRAVENOUS at 11:02

## 2024-11-12 RX ADMIN — SODIUM CHLORIDE, POTASSIUM CHLORIDE, SODIUM LACTATE AND CALCIUM CHLORIDE: 600; 310; 30; 20 INJECTION, SOLUTION INTRAVENOUS at 07:29

## 2024-11-12 RX ADMIN — PREGABALIN 75 MG: 75 CAPSULE ORAL at 06:45

## 2024-11-12 RX ADMIN — PROPOFOL 50 MG: 10 INJECTION, EMULSION INTRAVENOUS at 07:34

## 2024-11-12 RX ADMIN — ROCURONIUM BROMIDE 30 MG: 10 INJECTION INTRAVENOUS at 07:33

## 2024-11-12 RX ADMIN — PROPOFOL 25 MCG/KG/MIN: 10 INJECTION, EMULSION INTRAVENOUS at 07:40

## 2024-11-12 RX ADMIN — ACETAMINOPHEN 1000 MG: 500 TABLET ORAL at 06:45

## 2024-11-12 RX ADMIN — PROPOFOL 200 MG: 10 INJECTION, EMULSION INTRAVENOUS at 07:33

## 2024-11-12 RX ADMIN — HYDROMORPHONE HYDROCHLORIDE 0.3 MG: 1 INJECTION, SOLUTION INTRAMUSCULAR; INTRAVENOUS; SUBCUTANEOUS at 11:56

## 2024-11-12 RX ADMIN — ACETAMINOPHEN 1000 MG: 500 TABLET ORAL at 13:16

## 2024-11-12 RX ADMIN — OXYCODONE 5 MG: 5 TABLET ORAL at 11:56

## 2024-11-12 RX ADMIN — MELOXICAM 15 MG: 15 TABLET ORAL at 06:45

## 2024-11-12 RX ADMIN — DEXAMETHASONE SODIUM PHOSPHATE 4 MG: 10 INJECTION, SOLUTION INTRAMUSCULAR; INTRAVENOUS at 07:35

## 2024-11-12 RX ADMIN — FAMOTIDINE 20 MG: 20 TABLET, FILM COATED ORAL at 06:45

## 2024-11-12 RX ADMIN — ONDANSETRON 4 MG: 2 INJECTION INTRAMUSCULAR; INTRAVENOUS at 10:01

## 2024-11-12 RX ADMIN — NEOSTIGMINE 5 MG: 1 INJECTION INTRAVENOUS at 10:13

## 2024-11-12 RX ADMIN — ACETAMINOPHEN 1000 MG: 500 TABLET ORAL at 19:46

## 2024-11-12 RX ADMIN — MIDAZOLAM HYDROCHLORIDE 2 MG: 1 INJECTION, SOLUTION INTRAMUSCULAR; INTRAVENOUS at 06:46

## 2024-11-12 RX ADMIN — SODIUM CHLORIDE 2000 MG: 900 INJECTION INTRAVENOUS at 07:39

## 2024-11-12 NOTE — ANESTHESIA PROCEDURE NOTES
Peripheral Block    Pre-sedation assessment completed: 11/12/2024 7:34 AM    Patient reassessed immediately prior to procedure    Patient location during procedure: OR  Start time: 11/12/2024 7:35 AM  Reason for block: at surgeon's request and post-op pain management  Performed by  Anesthesiologist: Darek Capellan MD  Preanesthetic Checklist  Completed: patient identified, IV checked, site marked, risks and benefits discussed, surgical consent, monitors and equipment checked, pre-op evaluation and timeout performed  Prep:  Pt Position: supine  Sterile barriers:cap, gloves, mask and washed/disinfected hands  Prep: ChloraPrep  Patient monitoring: blood pressure monitoring, continuous pulse oximetry and EKG  Procedure  Performed under: general  Guidance:ultrasound guided and landmark technique  Images:still images obtained, printed/placed on chart    Laterality:Bilateral  Block Type:PECS I and PECS II  Injection Technique:single-shot  Needle Type:short-bevel  Needle Gauge:20 G  Resistance on Injection: none    Medications Used: bupivacaine PF (MARCAINE) 0.25 % injection - Injection   60 mL - 11/12/2024 7:35:00 AM  dexamethasone sodium phosphate injection - Injection   4 mg - 11/12/2024 7:35:00 AM      Medications  Preservative Free Saline:10ml  Comment:Block Injection:  Total volume of LA divided between Right and Left sided blocks         Post Assessment  Injection Assessment: negative aspiration for heme, incremental injection and no paresthesia on injection  Patient Tolerance:comfortable throughout block  Complications:no  Additional Notes  Interpectoral-Pectoserratus Plane   A high-frequency linear transducer, with sterile cover, was placed medial to the coracoid process in the paramedian sagittal plane. The transducer was moved caudally to the 4th rib and rotated slightly to allow an in-plane needle trajectory from medial to lateral. Pectoralis Major Muscle (PMM), Pectoralis Minor Muscle (PmM), Thoracoacromial  "Artery, Ribs, and Pleura were identified under ultrasound. The insertion site was prepped in sterile fashion and then localized with 2-5 ml of 1% Lidocaine. Using ultrasound-guidance, a 20-gauge B-Preciado 4\" Ultraplex 360 non-stimulating echogenic needle was advanced in plane until the tip of the needle was in the fascial plane between the PMM and PmM, lateral to the Thoracoacromial Artery. 1-3ml of preservative free normal saline was used to hydro-dissect the fascial planes. After the fascial plane was verified, 10ml local anesthetic (LA) was injected for Interpectoral fascial plane block. The needle was continued along the same path to the level of the 4th rib below PmM.  Initially preservative free normal saline was used to confirm needle position and then 20 ml of LA was injected for Pectoserratus fascial plane block. Aspiration every 5 ml to prevent intravascular injection. Injection was completed with negative aspiration of blood and negative intravascular injection. Injection pressures were normal with minimal resistance.     Performed by: Darek Capellan MD            "

## 2024-11-12 NOTE — INTERVAL H&P NOTE
"Pre-Op H&P (See Recent Office Note Attached for Full H&P)    History and physical note from office reviewed and updated with the following, otherwise there are no changes in H&P:      Review of Systems:  General ROS:  no fever, chills, rashes.  No change since last office visit.  No recent sick exposure  Cardiovascular ROS: no chest pain or dyspnea on exertion  Respiratory ROS: no cough, shortness of breath, or wheezing    Immunization History:   Influenza:  no   Pneumococcal:  no  Tetanus: up-to-date    Meds:    No current facility-administered medications on file prior to encounter.     No current outpatient medications on file prior to encounter.       Vital Signs:  /67 (BP Location: Right arm, Patient Position: Lying)   Pulse 74   Temp 98.2 °F (36.8 °C) (Temporal)   Resp 16   Ht 170.2 cm (67\")   Wt 90.3 kg (199 lb 1.2 oz)   LMP 11/03/2024   SpO2 100%   BMI 31.18 kg/m²     Physical Exam:    CV:  S1S2 regular rate and rhythm, no murmur               Resp:  Clear to auscultation; respirations regular, even and unlabored    Results Review:     Lab Results   Component Value Date    WBC 7.02 11/08/2024    HGB 12.3 11/08/2024    HCT 36.6 11/08/2024    MCV 89.5 11/08/2024     11/08/2024    GLUCOSE 83 11/08/2024    BUN 10 11/08/2024    CREATININE 0.85 11/08/2024     11/08/2024    K 4.0 11/08/2024     11/08/2024    CO2 24.0 11/08/2024    CALCIUM 9.4 11/08/2024    ALBUMIN 4.4 11/08/2024    AST 16 11/08/2024    ALT 13 11/08/2024    BILITOT 0.3 11/08/2024   I reviewed the patient's new clinical results.    Cancer Staging (if applicable)  Cancer Patient: left breast cancer - Clinical stage IIA  (T2, N0, M0) 37mm grade 3 IDC at 11o'clock,  ER/AR positive,  HER2 negative     Assessment/Plan:  Malignant Neoplasm of upper inner quadrant left female breast  /  LEFT SKIN SPARING MASTECTOMY, LEFT SENTINEL NODE BIOPSY, PROPHYLACTIC RIGHT SKIN SPARING MASTECTOMY       VALDO Shah   11/12/2024 "   07:05 EST

## 2024-11-12 NOTE — ADDENDUM NOTE
Addendum  created 11/12/24 1130 by Ann Vernon CRNA    Clinical Note Signed, Diagnosis association updated, Intraprocedure Blocks edited

## 2024-11-12 NOTE — OP NOTE
Operative Note    Eliane Argueta  [id number]   1990     Date of Surgery:  11/12/2024    Pre-Operative Diagnosis: Left breast cancer in the upper inner quadrant    Post-Operative Diagnosis: Left breast cancer in the upper inner quadrant    Procedure: Left sentinel lymph node injection                      Left skin sparing mastectomy                      Left deep subfascial sentinel lymph node biopsy                      Left axillary lymph node dissection                      Prophylactic right skin sparing mastectomy    Anesthesia:  General     [place] Node Biopsy for Breast Cancer - Left  Operation performed with curative intent. Yes   Tracer(s) used to identify sentinel nodes in the upfront surgery (non-neoadjuvant) setting (select all that apply). Radioactive tracer   Tracer(s) used to identify sentinel nodes in the neoadjuvant setting (select all that apply). N/A   All nodes (colored or non-colored) present at the end of a dye-filled lymphatic channel were removed. N/A   All significantly radioactive nodes were removed. Yes   All palpably suspicious nodes were removed. N/A   Biopsy-proven positive nodes marked with clips prior to chemotherapy were identified and removed. N/A    and Axillary Lymph Node Dissection for Breast Cancer - Left  Operation performed with curative intent. Yes   Resection was performed within the boundaries of the axillary vein, chest wall (serratus anterior), and latissimus dorsi. Yes   Nerves identified and preserved during dissection (select all that apply). Long thoracic nerve, Thoracodorsal nerve, and Branches of the intercostobrachial nerves   Level III nodes were removed. No             Surgeon:  Hannah Rooney MD    Circulator: Trudy Faye RN; Bette Vega RN  Scrub Person: Lavonne Black; Marisela Lancaster  Nursing Assistant: Noemi Mitchell PCT  Assistant: Geri Mays PA     Assistant: Geri Mays PA    Estimated Blood Loss: Very  minimal    Findings: Left sentinel lymph node positive for macrometastases    Complications: None      Indication for Procedure: Mrs. Argueta is a healthy 34-year-old lady who presented with a palpable left breast mass at the 11 o'clock position with workup remarkable for the 37 mm invasive ductal carcinoma that is high-grade.  She presents today for the above procedure with plan to have delayed reconstruction.    Procedure: Patient was taken to the operating room by anesthesia placed supine on the table.  Following induction of general endotracheal esthesia, SCDs were placed.  She received 2 g of Kefzol IV.  Anesthesia placed bilateral ultrasound-guided pectoralis nerve block.  550 mCi of radioactive technetium was injected in the left subareolar region.  The breasts, chest, axillas and upper arms were then prepped and draped in a sterile fashion.  Timeout was observed.  We proceeded with the prophylactic right skin sparing mastectomy which was done via a transverse elliptical incision, encompassing the areola and nipple complex.  A superior then inferior flaps were raised dissection the breast tissue away from the flaps down to where the muscle was exposed, maintaining adequate thickness of the flaps.  The breast was then removed off of the underlying pectoralis major muscle taking the fascia along with the specimen.  Larger vessels of the breast were ligated with 3-0 silk suture.  The breast was removed as a whole, marked with a silk suture laterally, weighed and sent to pathology for permanent section.  The wound was irrigated with warm water with clear return of fluid noted.  We then proceeded with the left skin sparing mastectomy which was done in a similar fashion.  Once in the axilla we identified a deep subfascial sentinel lymph node that had a very high radioactive count.  This was excised and sent to pathology for frozen section and noted to be positive for macrometastases.  The breast was also removed as a  whole, marked with a silk suture laterally, weighed and sent to pathology for permanent section.  We then proceeded with the actual lymph node dissection were actual lymph nodes were excised, preserving the long thoracic, thoracodorsal as well as the intercostal brachial nerves.  These were sent to pathology for permanent section.  Following adequate hemostasis, the wound was irrigated with water with clear return of fluid noted.  15 [place] round Gianni-Shane drains were then placed percutaneously on either side and anchored to the skin with 2-0 silk suture.  The subcutaneous tissues were approximated with interrupted 3-0 Vicryl sutures and the skin incisions were covered with Exofin fusion Premiere dressings.  ABDs with a Ace bandage were then applied.  The patient tolerated the procedure well with no complications.  She was extubated and taken to the recovery room in a stable condition.  Sponge count and needle count were correct at the end of the procedure.    Assistant: Geri Mays PA  was responsible for performing the following activities: Retraction, Suction, Closing, and Placing Dressing and their skilled assistance was necessary for the success of this case.      Hannah Rooney MD  11/12/24  10:31 EST

## 2024-11-12 NOTE — ANESTHESIA PROCEDURE NOTES
Airway  Urgency: elective    Date/Time: 11/12/2024 7:38 AM  Airway not difficult    General Information and Staff    Patient location during procedure: OR  CRNA/CAA: Ann Vernon CRNA  SRNA: Nata Smith SRNA  Indications and Patient Condition  Indications for airway management: airway protection    Preoxygenated: yes  MILS not maintained throughout  Mask difficulty assessment: 2 - vent by mask + OA or adjuvant +/- NMBA    Final Airway Details  Final airway type: endotracheal airway      Successful airway: ETT  Cuffed: yes   Successful intubation technique: video laryngoscopy  Endotracheal tube insertion site: oral  Blade: Rogers  Blade size: 3  ETT size (mm): 7.0  Cormack-Lehane Classification: grade I - full view of glottis  Placement verified by: chest auscultation and capnometry   Measured from: lips  ETT/EBT  to lips (cm): 21  Number of attempts at approach: 2  Assessment: lips, teeth, and gum same as pre-op and atraumatic intubation    Additional Comments  Negative epigastric sounds, Breath sound equal bilaterally with symmetric chest rise and fall

## 2024-11-12 NOTE — BRIEF OP NOTE
BREAST MASTECTOMY WITH SENTINEL NODE BIOPSY BILATERAL  Progress Note    Eliane Argueta  11/12/2024    Pre-op Diagnosis:   Left breast cancer in the upper inner quadrant       Post-Op Diagnosis Codes:     * Malignant neoplasm of upper-inner quadrant of left female breast [C50.212]    Procedure/CPT® Codes:        Procedure(s):  LEFT SKIN SPARING MASTECTOMY, LEFT SENTINEL NODE BIOPSY, PROPHYLACTIC RIGHT SKIN SPARING MASTECTOMY, LEFT AXILLARY NODE DISSECTION              Surgeon(s):  Hannah Rooney MD    Anesthesia: General    Staff:   Circulator: Trudy Faye RN; Bette Vega RN  Scrub Person: Lavonne Black; Marisela Lancaster  Nursing Assistant: Noemi Mitchell PCT  Assistant: Geri Mays PA  Assistant: Geri Mays PA      Estimated Blood Loss: minimal    Urine Voided: * No values recorded between 11/12/2024  7:29 AM and 11/12/2024 10:25 AM *    Specimens:                Specimens       ID Source Type Tests Collected By Collected At Frozen?    A Breast, Right Tissue TISSUE PATHOLOGY EXAM   Hannah Rooney MD 11/12/24 0843 No    Description: stitch is lateral    B [place] Lymph Node Tissue TISSUE PATHOLOGY EXAM   Hannah Rooney MD 11/12/24 0918 Yes    Description: left sentinel node    C Breast, Left Tissue TISSUE PATHOLOGY EXAM   Hannah Rooney MD 11/12/24 0943 No    Description: stitch is lateral    D Lymph Node Tissue TISSUE PATHOLOGY EXAM   Hannah Rooney MD 11/12/24 0953 No    Description: left axillary node dissection                  Drains:   Closed/Suction Drain 1 Right Breast Bulb 15 Fr. (Active)       Closed/Suction Drain 1 Left Breast Bulb 15 Fr. (Active)       Findings: Left sentinel lymph node positive for macrometastases        Complications: None    Assistant: Geri Mays PA  was responsible for performing the following activities: Retraction, Suction, Closing, and Placing Dressing and their skilled assistance was necessary for the success  of this case.    Hannah Rooney MD     Date: 11/12/2024  Time: 10:29 EST

## 2024-11-12 NOTE — ANESTHESIA POSTPROCEDURE EVALUATION
Patient: Eliane Argueta    Procedure Summary       Date: 11/12/24 Room / Location:  [facility]    Anesthesia Start: 0729 Anesthesia Stop: 1038    Procedure: LEFT SKIN SPARING MASTECTOMY, LEFT SENTINEL NODE BIOPSY, PROPHYLACTIC RIGHT SKIN SPARING MASTECTOMY, LEFT AXILLARY NODE DISSECTION (Bilateral: Breast) Diagnosis: Malignant neoplasm of upper-inner quadrant of left female breast    Surgeons: Hannah Rooney MD Provider: Darek Capellan MD    Anesthesia Type: general with block ASA Status: 3            Anesthesia Type: general with block    Vitals  Vitals Value Taken Time   BP     Temp     Pulse 73 11/12/24 1038   Resp     SpO2 98 % 11/12/24 1038   Vitals shown include unfiled device data.        Post Anesthesia Care and Evaluation    Patient location during evaluation: PACU  Patient participation: complete - patient participated  Level of consciousness: sleepy but conscious  Pain management: adequate    Airway patency: patent  Anesthetic complications: No anesthetic complications  PONV Status: none  Cardiovascular status: hemodynamically stable and acceptable  Respiratory status: nonlabored ventilation, acceptable and nasal cannula  Hydration status: acceptable    Comments: Vital signs stable. See vital sign flowsheet.

## 2024-11-12 NOTE — ANESTHESIA PREPROCEDURE EVALUATION
Anesthesia Evaluation     Patient summary reviewed and Nursing notes reviewed   no history of anesthetic complications:   NPO Solid Status: > 8 hours  NPO Liquid Status: > 2 hours           Airway   Mallampati: II  TM distance: >3 FB  Neck ROM: full  No difficulty expected  Dental - normal exam     Pulmonary - negative pulmonary ROS and normal exam    breath sounds clear to auscultation  Cardiovascular - normal exam    Rhythm: regular  Rate: normal        Neuro/Psych- negative ROS  GI/Hepatic/Renal/Endo    (+) obesity    Musculoskeletal (-) negative ROS    Abdominal    Substance History      OB/GYN          Other      history of cancer (Breast ca)                Anesthesia Plan    ASA 3     general with block     (Bilateral PECS I/II blocks post-induction for post-operative analgesia per request of Dr. MCKAY  )  intravenous induction     Anesthetic plan, risks, benefits, and alternatives have been provided, discussed and informed consent has been obtained with: patient.    Plan discussed with CRNA.    CODE STATUS:

## 2024-11-12 NOTE — PROGRESS NOTES
"Eliane Argueta  1990  [id number]    Surgery Post - Operative Note    Date of visit: 11/12/2024    Subjective: Sitting up in bed  Family at bedside  No complaints of pain  Voided well    Objective:    /71 (BP Location: Right arm, Patient Position: Lying)   Pulse 79   Temp 97.5 °F (36.4 °C) (Oral)   Resp 16   Ht 170.2 cm (67\")   Wt 90.3 kg (199 lb 1.2 oz)   LMP 11/03/2024   SpO2 95%   BMI 31.18 kg/m²     Intake/Output Summary (Last 24 hours) at 11/12/2024 1803  Last data filed at 11/12/2024 1756  Gross per 24 hour   Intake 400 ml   Output 1255 ml   Net -855 ml         CV: Regular rate and rhythm  L: normal air entry  BREAST: Bilateral mastectomy incisions are intact  Very minimal fluctuance on the left  Left Gianni-Shane drain with thin bloody drainage  Right Gianni-Shane drain with minimal drainage      LABS:    Results from last 7 days   Lab Units 11/08/24  0854   WBC 10*3/mm3 7.02   HEMOGLOBIN g/dL 12.3   HEMATOCRIT % 36.6   PLATELETS 10*3/mm3 277     Results from last 7 days   Lab Units 11/08/24  0854   SODIUM mmol/L 137   POTASSIUM mmol/L 4.0   CHLORIDE mmol/L 102   CO2 mmol/L 24.0   BUN mg/dL 10   CREATININE mg/dL 0.85   CALCIUM mg/dL 9.4   BILIRUBIN mg/dL 0.3   ALK PHOS U/L 72   ALT (SGPT) U/L 13   AST (SGOT) U/L 16   GLUCOSE mg/dL 83     Results from last 7 days   Lab Units 11/08/24  0854   SODIUM mmol/L 137   POTASSIUM mmol/L 4.0   CHLORIDE mmol/L 102   CO2 mmol/L 24.0   BUN mg/dL 10   CREATININE mg/dL 0.85   GLUCOSE mg/dL 83   CALCIUM mg/dL 9.4     Lab Results   Lab Value Date/Time    LIPASE 96 03/06/2023 0228       Assessment/ Plan: Stable course status post bilateral skin sparing mastectomies and left sentinel lymph node biopsy with axillary lymph node dissection  Minimal swelling on the left.  Plan to place an ice pack and continue to monitor closely  Gianni-Shane drain is draining adequately  Continue with the current management    Problem List Items Addressed This Visit  "   None  Visit Diagnoses       Malignant neoplasm of upper-inner quadrant of left female breast        Relevant Orders    Tissue Pathology Exam              Hannah Rooney MD  11/12/2024  18:03 EST

## 2024-11-13 VITALS
RESPIRATION RATE: 16 BRPM | DIASTOLIC BLOOD PRESSURE: 70 MMHG | OXYGEN SATURATION: 95 % | HEIGHT: 67 IN | SYSTOLIC BLOOD PRESSURE: 116 MMHG | TEMPERATURE: 98.2 F | WEIGHT: 199.08 LBS | HEART RATE: 60 BPM | BODY MASS INDEX: 31.25 KG/M2

## 2024-11-13 PROCEDURE — 25010000002 CEFAZOLIN PER 500 MG: Performed by: SURGERY

## 2024-11-13 RX ORDER — OXYCODONE HYDROCHLORIDE 5 MG/1
5 TABLET ORAL EVERY 8 HOURS PRN
Qty: 7 TABLET | Refills: 0 | Status: SHIPPED | OUTPATIENT
Start: 2024-11-13 | End: 2024-11-17

## 2024-11-13 RX ADMIN — OXYCODONE 5 MG: 5 TABLET ORAL at 09:52

## 2024-11-13 RX ADMIN — DIAZEPAM 2 MG: 2 TABLET ORAL at 00:33

## 2024-11-13 RX ADMIN — ACETAMINOPHEN 1000 MG: 500 TABLET ORAL at 00:22

## 2024-11-13 RX ADMIN — ACETAMINOPHEN 1000 MG: 500 TABLET ORAL at 06:11

## 2024-11-13 RX ADMIN — SODIUM CHLORIDE 2000 MG: 900 INJECTION INTRAVENOUS at 00:22

## 2024-11-13 NOTE — CASE MANAGEMENT/SOCIAL WORK
Case Management Discharge Note      Final Note: Home         Selected Continued Care - Admitted Since 11/12/2024       Destination    No services have been selected for the patient.                Durable Medical Equipment    No services have been selected for the patient.                Dialysis/Infusion    No services have been selected for the patient.                Home Medical Care    No services have been selected for the patient.                Therapy    No services have been selected for the patient.                Community Resources    No services have been selected for the patient.                Community & DME    No services have been selected for the patient.                         Final Discharge Disposition Code: 01 - home or self-care

## 2024-11-13 NOTE — PLAN OF CARE
Goal Outcome Evaluation:           Progress: (P) improving  Outcome Evaluation: (P) Patient VSS. UOP WNL. PRN Oxycodone x1. PRN Valium x1. Patient has some swelling with ecchymosis on her left side. Bilateral OPAL drains patent and draining. Right side has had minimal output and left side has had out 40cc throughout the night. Pt tolerating PO foods and liquids well with no complaint of nausea. Will continue to monitor according to POC.

## 2024-11-13 NOTE — PROGRESS NOTES
"Eliane Argueta  1990  [id number]    Surgery Progress Note    Date of visit: 11/13/2024    Subjective: Comfortable  Very minimal drainage from left Gianni-Shane  Voiding well    Objective:    /70 (BP Location: Left arm, Patient Position: Lying)   Pulse 60   Temp 98.2 °F (36.8 °C) (Oral)   Resp 16   Ht 170.2 cm (67\")   Wt 90.3 kg (199 lb 1.2 oz)   LMP 11/03/2024   SpO2 95%   BMI 31.18 kg/m²     Intake/Output Summary (Last 24 hours) at 11/13/2024 0806  Last data filed at 11/13/2024 0252  Gross per 24 hour   Intake 300 ml   Output 2880 ml   Net -2580 ml       CV: Regular rate and rhythm  L: normal air entry  BREAST: Bilateral mastectomy incisions are intact  Flaps are viable with no swelling  Adequate Gianni-Shane drainage      LABS:    Results from last 7 days   Lab Units 11/08/24  0854   WBC 10*3/mm3 7.02   HEMOGLOBIN g/dL 12.3   HEMATOCRIT % 36.6   PLATELETS 10*3/mm3 277     Results from last 7 days   Lab Units 11/08/24  0854   SODIUM mmol/L 137   POTASSIUM mmol/L 4.0   CHLORIDE mmol/L 102   CO2 mmol/L 24.0   BUN mg/dL 10   CREATININE mg/dL 0.85   CALCIUM mg/dL 9.4   BILIRUBIN mg/dL 0.3   ALK PHOS U/L 72   ALT (SGPT) U/L 13   AST (SGOT) U/L 16   GLUCOSE mg/dL 83     Results from last 7 days   Lab Units 11/08/24  0854   SODIUM mmol/L 137   POTASSIUM mmol/L 4.0   CHLORIDE mmol/L 102   CO2 mmol/L 24.0   BUN mg/dL 10   CREATININE mg/dL 0.85   GLUCOSE mg/dL 83   CALCIUM mg/dL 9.4     Lab Results   Lab Value Date/Time    LIPASE 96 03/06/2023 0228         Assessment/ Plan: Stable course status post bilateral skin sparing mastectomies with left sentinel lymph node biopsy and axillary lymph node dissection  Patient is progressing well with adequate drainage  Instructions were given to her and her   Home today  Tylenol 1000 mg p.o. 3 times daily for 4 days  Oxycodone as needed  Follow-up in the cancer clinic on 11/20/2024    Problem List Items Addressed This Visit    None  Visit Diagnoses       " Malignant neoplasm of upper-inner quadrant of left female breast        Relevant Orders    Tissue Pathology Exam              Hannah Rooney MD  11/13/2024  08:06 EST

## 2024-11-14 ENCOUNTER — NURSE TRIAGE (OUTPATIENT)
Dept: CALL CENTER | Facility: HOSPITAL | Age: 34
End: 2024-11-14
Payer: MEDICAID

## 2024-11-14 NOTE — TELEPHONE ENCOUNTER
Patient noticed yellow drainage from drain at right breast today. Appears to be clear yellow. No other signs of infection. Reviewed protocol with patient. Advised on home care. Follow-up as scheduled.    Reason for Disposition   [1] Caller has NON-URGENT question AND [2] triager unable to answer question    Additional Information   Negative: [1] Major abdominal surgical incision AND [2] wound gaping open AND [3] visible internal organs   Negative: Sounds like a life-threatening emergency to the triager   Negative: Patient has a Negative Pressure Wound Therapy device   Negative: Patient is followed by a wound clinic or wound specialist for this wound   Negative: [1] Bleeding from incision AND [2] won't stop after 10 minutes of direct pressure   Negative: [1] Bleeding (more than a few drops) from incision AND [2] tracheostomy or blood vessel surgery (e.g., carotid endarterectomy, femoral bypass graft, kidney dialysis fistula)   Negative: [1] Widespread rash AND [2] bright red, sunburn-like   Negative: Severe pain in the incision   Negative: [1] Incision gaping open AND [2] < 48 hours since wound re-opened   Negative: [1] Incision gaping open AND [2] length of opening > 2 inches (5 cm)   Negative: Patient sounds very sick or weak to the triager   Negative: Sounds like a serious complication to the triager   Negative: Fever > 100.4 F (38.0 C)   Negative: [1] Incision looks infected (spreading redness, pain) AND [2] fever > 99.5 F (37.5 C)   Negative: [1] Incision looks infected (spreading redness, pain) AND [2] large red area (> 2 in. or 5 cm)   Negative: [1] Incision looks infected (spreading redness, pain) AND [2] face wound   Negative: [1] Red streak runs from the incision AND [2] longer than 1 inch (2.5 cm)   Negative: [1] Pus or bad-smelling fluid draining from incision AND [2] no fever   Negative: [1] Post-op pain AND [2] not controlled with pain medications   Negative: Dressing soaked with blood or body fluid  "(e.g., drainage)   Negative: [1] Scant bleeding (e.g., few drops) from incision AND AND [2] tracheostomy or blood vessel surgery (e.g., carotid endarterectomy, femoral bypass graft, kidney dialysis fistula)   Negative: [1] Raised bruise and [2] size > 2 inches (5 cm) and expanding   Negative: [1] Caller has URGENT question AND [2] triager unable to answer question   Negative: [1] INCREASING pain in incision AND [2] > 2 days (48 hours) since surgery   Negative: [1] Small red area or streak AND [2] no fever   Negative: [1] Clear or blood-tinged fluid draining from wound AND [2] no fever   Negative: [1] Incision gaping open AND [2] > 48 hours since wound re-opened AND [3] length of opening > 1/2 inch (12 mm)   Negative: [1] Incision on face gaping open AND [2] > 48 hours since wound re-opened AND [3] length of opening > 1/4 inch (6 mm)   Negative: Suture or staple removal is overdue   Negative: [1] Suture or staple came out early AND [2] caller wants wound checked    Answer Assessment - Initial Assessment Questions  1. SYMPTOM: \"What's the main symptom you're concerned about?\" (e.g., drainage, incision opening up, pain, redness)      Yellow drainage in drain  2. ONSET: \"When did change  start?\"      Today   3. SURGERY: \"What surgery did you have?\"      Double mastectomy  4. DATE of SURGERY: \"When was the surgery?\"       See chart  5. INCISION SITE: \"Where is the incision located?\"       Right breast  6. REDNESS: \"Is there any redness at the incision site?\" If Yes, ask: \"How wide across is the redness?\" (Inches, centimeters)       No   7. PAIN: \"Is there any pain?\" If Yes, ask: \"How bad is it?\"  (Scale 1-10; or mild, moderate, severe)    - NONE (0): no pain    - MILD (1-3): doesn't interfere with normal activities     - MODERATE (4-7): interferes with normal activities or awakens from sleep     - SEVERE (8-10): excruciating pain, unable to do any normal activities      No change  8. BLEEDING: \"Is there any bleeding?\" If " "Yes, ask: \"How much?\" and \"Where?\"      Blood in drain  9. DRAINAGE: \"Is there any drainage from the incision site?\" If Yes, ask: \"What color and how much?\" (e.g., red, cloudy, pus; drops, teaspoon)      Clear yellow drainage in drain tube  10. FEVER: \"Do you have a fever?\" If Yes, ask: \"What is your temperature, how was it measured, and when did it start?\"        No   11. OTHER SYMPTOMS: \"Do you have any other symptoms?\" (e.g., dizziness, rash elsewhere on body, shaking chills, weakness)        No    Protocols used: Post-Op Incision Symptoms and Questions-ADULT-    "

## 2024-11-15 LAB
CYTO UR: NORMAL
LAB AP CASE REPORT: NORMAL
LAB AP CLINICAL INFORMATION: NORMAL
LAB AP DIAGNOSIS COMMENT: NORMAL
Lab: NORMAL
PATH REPORT.FINAL DX SPEC: NORMAL
PATH REPORT.GROSS SPEC: NORMAL

## 2024-11-20 ENCOUNTER — OFFICE VISIT (OUTPATIENT)
Dept: RADIATION ONCOLOGY | Facility: HOSPITAL | Age: 34
End: 2024-11-20
Payer: MEDICAID

## 2024-11-20 ENCOUNTER — CONSULT (OUTPATIENT)
Dept: ONCOLOGY | Facility: CLINIC | Age: 34
End: 2024-11-20
Payer: MEDICAID

## 2024-11-20 ENCOUNTER — HOSPITAL ENCOUNTER (OUTPATIENT)
Dept: RADIATION ONCOLOGY | Facility: HOSPITAL | Age: 34
Setting detail: RADIATION/ONCOLOGY SERIES
Discharge: HOME | End: 2024-11-20
Payer: MEDICAID

## 2024-11-20 VITALS
HEIGHT: 67 IN | WEIGHT: 200 LBS | DIASTOLIC BLOOD PRESSURE: 90 MMHG | BODY MASS INDEX: 31.39 KG/M2 | SYSTOLIC BLOOD PRESSURE: 132 MMHG | HEART RATE: 74 BPM | RESPIRATION RATE: 18 BRPM

## 2024-11-20 VITALS
RESPIRATION RATE: 18 BRPM | SYSTOLIC BLOOD PRESSURE: 132 MMHG | HEIGHT: 67 IN | WEIGHT: 200 LBS | DIASTOLIC BLOOD PRESSURE: 90 MMHG | BODY MASS INDEX: 31.39 KG/M2 | HEART RATE: 74 BPM

## 2024-11-20 DIAGNOSIS — C50.212 CARCINOMA OF UPPER-INNER QUADRANT OF FEMALE BREAST, LEFT: Primary | ICD-10-CM

## 2024-11-20 PROCEDURE — G0463 HOSPITAL OUTPT CLINIC VISIT: HCPCS

## 2024-11-20 NOTE — PROGRESS NOTES
"  Subjective     PROBLEM LIST:  [id number] ER+ (70%, 3+) TN+ (60%, 3+), Her2 negative (0+) invasive ductal carcinoma of the left breast  CT chest abdomen and pelvis and bone scan done at outside hospital showed no evidence of metastatic disease.  Bilateral mastectomy on 11/12/24.  Pathology showed a 3.5 cm high grade tumor.  1/2 SLN involved.  2 additional axillary LN negative for metastatic carcinoma.  Rheumatoid arthritis  anxiety    CHIEF COMPLAINT: breast cancer      HISTORY OF PRESENT ILLNESS:  The patient is a 34 y.o. female, referred for evaluation of a recently diagnosed breast cancer.    She previously has seen Dr. Michele in [place], but says that she wants to have all of her care in 1 place and is interested in transferring here.    She lives with her  in [place].  They have 2 teenage kids.  She previously worked at a [employer] center but has not been working since this summer.    REVIEW OF SYSTEMS:  A 14 point review of systems was performed and is negative except as noted above.    Past Medical History:   Diagnosis Date    Anxiety     Breast cancer     Cancer 09/2024    LEFT - breast    Rheumatoid arthritis                Objective     /90   Pulse 74   Resp 18   Ht 170.2 cm (67\")   Wt 90.7 kg (200 lb)   LMP 11/03/2024   BMI 31.32 kg/m²   Performance Status:  ECOG score: 0           General: well appearing female in no acute distress  Neuro: alert and oriented  HEENT: sclerae anicteric, oropharynx clear  Extremities: no lower extremity edema  Skin: no rashes, lesions, bruising, or petechiae  Psych: mood and affect appropriate    Lab Results   Component Value Date    WBC 7.02 11/08/2024    HGB 12.3 11/08/2024    HCT 36.6 11/08/2024    MCV 89.5 11/08/2024     11/08/2024     Lab Results   Component Value Date    GLUCOSE 83 11/08/2024    BUN 10 11/08/2024    CREATININE 0.85 11/08/2024    BCR 11.8 11/08/2024    K 4.0 11/08/2024    CO2 24.0 11/08/2024    CALCIUM 9.4 11/08/2024    " ALBUMIN 4.4 11/08/2024    AST 16 11/08/2024    ALT 13 11/08/2024                 ASSESSMENT AND PLAN:     Eliane Argueta is a 34 y.o. female with a T2 N1 M0 ER positive HER2 negative invasive ductal carcinoma of the left breast.    We reviewed her pathology findings.  She is at higher risk of recurrence given her high-grade tumor and lymph node involvement.  We discussed adjuvant chemotherapy with Taxotere and cyclophosphamide.  We discussed the side effects of this regimen including alopecia, nausea, fatigue, myelosuppression, infusion reaction, neuropathy, and diarrhea.    She has met with Dr. Sherwood to discuss radiation treatment.    We discussed endocrine therapy with ovarian suppression along with anastrozole, and the addition of Kisqali for 3 years.    She is ready to move forward with treatment.  We will plan to start in about 3 weeks.                   A total greater than 60 mins minutes was spent in face to face patient time, examination, counseling, charting, reviewing test results, and reviewing outside records.    Angela Horvath MD    11/20/2024

## 2024-11-20 NOTE — PROGRESS NOTES
New Patient Office Visit      Encounter Date: 11/20/2024   Patient Name: Eliane Argueta  YOB: 1990   Medical Record Number: [id number]   Primary Diagnosis: Carcinoma of upper-inner quadrant of female breast, left [C50.212]   Cancer Staging: Cancer Staging   No matching staging information was found for the patient.      Chief Complaint:    Chief Complaint   Patient presents with    Breast Cancer       History of Present Illness: Eliane Argueta is a 34 y.o. female who is here for consultation regarding her invasive ductal carcinoma of the left breast.     She is accompanied by her spouse today. They are both working parents and have teenage children. 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 Her initial diagnostic imaging and biopsy was performed at an outside facility, with biopsy of a left breast returning as a +/-/- gr 3 invasive ductal carcinoma with LVI.   Dr. Micki Arrieta performed bilateral mastectomies with a left SLN eval and subsequent axillary dissection. Pathology returned benign from the right breast. The left breast had a gr 3 invasive ductal carcinoma (3.5 cm) with extensive ductal/lbular spread resected with negative margins. 1/2 SLN were involved and 0/2 additional axillary nodes dissected were involved.   [id number]  She had drains removed today. She is interested in reconstruction. She has previously established care with Dr. Juaquin Michele, medical oncologist. She is interested in meeting Dr. Angela Horvath today as she is considering having all her cancer care in [place].     Age at menarche:  10  Age at menopause:  N/A  Hormone replacement therapy:  NO  Personal history of breast cancer:  NO  Family history of breast cancer:  NO  Radiation to chest before age of 29:  NO  Age of first live birth:  19    Prior Radiation History: no  Pacemaker or ICD: no  Pregnant or Nursing: no    Subjective      Review of Systems: Review of Systems  "  Musculoskeletal:         Bilateral drains removed today-reports pain r/t removal   All other systems reviewed and are negative.      Past Medical History:   Past Medical History:   Diagnosis Date    Anxiety     Breast cancer     Cancer 2024    LEFT - breast    Rheumatoid arthritis        Past Surgical History:   Past Surgical History:   Procedure Laterality Date     SECTION      X'S 2    LAPAROSCOPIC TUBAL LIGATION      MASTECTOMY WITH SENTINEL NODE BIOPSY AND AXILLARY NODE DISSECTION Bilateral 2024    Procedure: SKIN SPARING MASTECTOMY, SENTINEL NODE BIOPSY AND AXILLARY NODE DISSECTION LEFT, PROPHYLACTIC RIGHT SKIN SPARING MASTECTOMY, LEFT AXILLARY NODE DISSECTION;  Surgeon: Hannah Rooney MD;  Location: [facility];  Service: General;  Laterality: Bilateral;    WISDOM TOOTH EXTRACTION         Family History:   Family History   Problem Relation Age of Onset    Diabetes Mother     Other (thyoid) Mother        Social History:   Social History     Socioeconomic History    Marital status:    Tobacco Use    Smoking status: Former     Types: Cigarettes    Tobacco comments:     QUIT SMOKING CIGS IN  PER PATIENT    Vaping Use    Vaping status: Former    Substances: Nicotine    Devices: Disposable   Substance and Sexual Activity    Alcohol use: Yes     Comment: RARE-2 XS YEAR    Drug use: Never    Sexual activity: Defer       Medications:     Current Outpatient Medications:     diazePAM (VALIUM) 5 MG tablet, Take 1 tablet every 12 hours by oral route as needed., Disp: , Rfl:     naproxen sodium (ANAPROX) 550 MG tablet, , Disp: , Rfl:     Allergies:   Allergies   Allergen Reactions    Morphine Other (See Comments)     \"IT MAKES ME ITCH\" EXTREME         Advanced Care Plan: N Advanced Care Planning was discussed. The patient does not have a living will documented in the medical record and declined to perform one today.  KPS/Quality of Life: 90 - Limited Activity  ECOG: (1) Restricted in " "physically strenuous activity, ambulatory and able to do work of light nature      Objective     Physical Exam:   Vital Signs:   Vitals:    11/20/24 1111   BP: 132/90   Pulse: 74   Resp: 18   Weight: 90.7 kg (200 lb)   Height: 170.2 cm (67\")   PainSc: 2  Comment: bilateral armpit-r/t drains removed     Body mass index is 31.32 kg/m².     Constitutional: The patient is a well-developed, well-nourished female  in no acute distress.  Alert and oriented ×3.  General: NAD, sitting comfortably  Eye: EOMI, anicteric sclerae  HENT: NC/AT, MMM  Neck: No JVD or cervical lymphadenopathy  Respiratory: Symmetric expansion, nonlabored respiration  Cardiovascular: Regular rate and rhythm.  No murmurs, rubs, or gallops are appreciated.  Abdomen: nontender, nondistended.   Musculoskeletal: No obvious joint deformities, range of motion intact in all 4 extremities  Neuro: Alert oriented x3, cranial nerves III through XII are grossly intact, with no focal neurological deficits noted on exam.  Psych: Mood and affect appropriate  Chest wall - drains removed       Assessment / Plan      Assessment/Plan:   Eliane Argueta is a pleasant 34 y.o. female with a gr 3 +/-/- [id number] invasive ductal carcinoma of the left breast managed with bilateral mastectomies and a left SLN eval/axillary LN excision of 2 additional nodes on 11/12/24. Fortunately margins were negative.   We discussed oncologic benefits of post mastectomy radiation in women with node positive disease. This is in accordance with recommendations from NCCN guidelines. We discussed the process of simulation and treatment as well as the acute and late toxicity profile of treatment including secondary malignancy risk.   She is tentatively interested in proceeding. We will sequence RT after chemotherapy if she chooses to have it and will coordinate with her plastic surgeon to coordinate radiation with her reconstructive plan. We discussed resources available through the cancer center " including opportunities to meet with a dietician, , and mental health provider. She was appreciative for the information. I will see her back in 2 months.         Diagnoses and all orders for this visit:    1. Carcinoma of upper-inner quadrant of female breast, left (Primary)         Follow Up:  Return in about 2 weeks (around 12/4/2024) for Office Visit.      Time:   I spent 65 minutes on this encounter today, 11/20/24. Activities that took place during this time include: preparing to see the patient, obtaining history, reviewing separately obtained history, performing a medically appropriate examination and evaluation, counseling and educating the patient, ordering medications/tests/procedures, communicating with other healthcare providers, documenting clinical information in the health record, and coordinating care for this patient.     Sincerely,        Sobia Sherwood MD  Radiation Oncology  This document has been signed by Sobia Sherwood MD on November 20, 2024 12:26 EST     NOTICE TO PATIENTS:   At [facility], we believe that sharing information builds trust and better relationships. You are receiving this note because you recently visited [facility]. It is possible you will see health information before a provider has talked with you about it. This kind of information can be easy to misunderstand. To help you fully understand what it means for your health, we urge you to discuss this note with your provider.

## 2024-11-20 NOTE — LETTER
"November 20, 2024     PATRICK Salazar  [address] [facility] [facility] [place] [address]    Patient: Eliane Argueta   YOB: 1990   Date of Visit: 11/20/2024     Dear PATRICK Salazar:       Thank you for referring Eliane Argueta to me for evaluation. Below are the relevant portions of my assessment and plan of care.    If you have questions, please do not hesitate to call me. I look forward to following Eliane along with you.         Sincerely,        Angela Horvath MD        CC: MD Alexandru Tripp Amy M, MD  11/20/24 1640  Sign when Signing Visit    Subjective    PROBLEM LIST:  [id number] ER+ (70%, 3+) AR+ (60%, 3+), Her2 negative (0+) invasive ductal carcinoma of the left breast  CT chest abdomen and pelvis and bone scan done at outside hospital showed no evidence of metastatic disease.  Bilateral mastectomy on 11/12/24.  Pathology showed a 3.5 cm high grade tumor.  1/2 SLN involved.  2 additional axillary LN negative for metastatic carcinoma.  Rheumatoid arthritis  anxiety    CHIEF COMPLAINT: breast cancer      HISTORY OF PRESENT ILLNESS:  The patient is a 34 y.o. female, referred for evaluation of a recently diagnosed breast cancer.    She previously has seen Dr. Michele in [place], but says that she wants to have all of her care in 1 place and is interested in transferring here.    She lives with her  in [place].  They have 2 teenage kids.  She previously worked at a [employer] but has not been working since this summer.    REVIEW OF SYSTEMS:  A 14 point review of systems was performed and is negative except as noted above.    Past Medical History:   Diagnosis Date   • Anxiety    • Breast cancer    • Cancer 09/2024    LEFT - breast   • Rheumatoid arthritis                Objective    /90   Pulse 74   Resp 18   Ht 170.2 cm (67\")   Wt 90.7 kg (200 lb)   LMP 11/03/2024   BMI 31.32 kg/m²   Performance Status:  ECOG score: 0       "     General: well appearing female in no acute distress  Neuro: alert and oriented  HEENT: sclerae anicteric, oropharynx clear  Extremities: no lower extremity edema  Skin: no rashes, lesions, bruising, or petechiae  Psych: mood and affect appropriate    Lab Results   Component Value Date    WBC 7.02 11/08/2024    HGB 12.3 11/08/2024    HCT 36.6 11/08/2024    MCV 89.5 11/08/2024     11/08/2024     Lab Results   Component Value Date    GLUCOSE 83 11/08/2024    BUN 10 11/08/2024    CREATININE 0.85 11/08/2024    BCR 11.8 11/08/2024    K 4.0 11/08/2024    CO2 24.0 11/08/2024    CALCIUM 9.4 11/08/2024    ALBUMIN 4.4 11/08/2024    AST 16 11/08/2024    ALT 13 11/08/2024                 ASSESSMENT AND PLAN:     Eliane Argueta is a 34 y.o. female with a T2 N1 M0 ER positive HER2 negative invasive ductal carcinoma of the left breast.    We reviewed her pathology findings.  She is at higher risk of recurrence given her high-grade tumor and lymph node involvement.  We discussed adjuvant chemotherapy with Taxotere and cyclophosphamide.  We discussed the side effects of this regimen including alopecia, nausea, fatigue, myelosuppression, infusion reaction, neuropathy, and diarrhea.    She has met with Dr. Sherwood to discuss radiation treatment.    We discussed endocrine therapy with ovarian suppression along with anastrozole, and the addition of Kisqali for 3 years.    She is ready to move forward with treatment.  We will plan to start in about 3 weeks.                   A total greater than 60 mins minutes was spent in face to face patient time, examination, counseling, charting, reviewing test results, and reviewing outside records.    Angela Horvath MD    11/20/2024

## 2024-11-22 DIAGNOSIS — Z17.0 MALIGNANT NEOPLASM OF LEFT BREAST IN FEMALE, ESTROGEN RECEPTOR POSITIVE, UNSPECIFIED SITE OF BREAST: Primary | ICD-10-CM

## 2024-11-22 DIAGNOSIS — D70.1 CHEMOTHERAPY-INDUCED NEUTROPENIA: ICD-10-CM

## 2024-11-22 DIAGNOSIS — Z51.11 ENCOUNTER FOR ANTINEOPLASTIC CHEMOTHERAPY: ICD-10-CM

## 2024-11-22 DIAGNOSIS — C50.912 MALIGNANT NEOPLASM OF LEFT BREAST IN FEMALE, ESTROGEN RECEPTOR POSITIVE, UNSPECIFIED SITE OF BREAST: Primary | ICD-10-CM

## 2024-11-22 DIAGNOSIS — T45.1X5A CHEMOTHERAPY-INDUCED NEUTROPENIA: ICD-10-CM

## 2024-11-22 RX ORDER — FILGRASTIM 480 UG/.8ML
INJECTION, SOLUTION INTRAVENOUS; SUBCUTANEOUS
Qty: 20 EACH | Refills: 1 | Status: SHIPPED | OUTPATIENT
Start: 2024-11-22

## 2024-12-03 ENCOUNTER — TELEPHONE (OUTPATIENT)
Dept: ONCOLOGY | Facility: CLINIC | Age: 34
End: 2024-12-03
Payer: MEDICAID

## 2024-12-03 NOTE — TELEPHONE ENCOUNTER
I called [place] medicaid and they notified me that the neupogen had been approved.  I then called [facility] in [place] and they said that they had the neupogen ready for the patient and she can pick it up with $0 copay.  She will be here Dec. 4 for education so I will notify that she can get it then.

## 2024-12-04 ENCOUNTER — HOSPITAL ENCOUNTER (OUTPATIENT)
Dept: ONCOLOGY | Facility: HOSPITAL | Age: 34
Discharge: HOME | End: 2024-12-04
Payer: MEDICAID

## 2024-12-04 ENCOUNTER — SPECIALTY PHARMACY (OUTPATIENT)
Dept: ONCOLOGY | Facility: HOSPITAL | Age: 34
End: 2024-12-04
Payer: MEDICAID

## 2024-12-04 ENCOUNTER — OFFICE VISIT (OUTPATIENT)
Dept: ONCOLOGY | Facility: CLINIC | Age: 34
End: 2024-12-04
Payer: MEDICAID

## 2024-12-04 VITALS
WEIGHT: 202 LBS | RESPIRATION RATE: 16 BRPM | TEMPERATURE: 97.5 F | HEART RATE: 86 BPM | OXYGEN SATURATION: 98 % | SYSTOLIC BLOOD PRESSURE: 128 MMHG | HEIGHT: 67 IN | DIASTOLIC BLOOD PRESSURE: 58 MMHG | BODY MASS INDEX: 31.71 KG/M2

## 2024-12-04 DIAGNOSIS — C50.212 CARCINOMA OF UPPER-INNER QUADRANT OF FEMALE BREAST, LEFT: Primary | ICD-10-CM

## 2024-12-04 PROCEDURE — 1160F RVW MEDS BY RX/DR IN RCRD: CPT | Performed by: NURSE PRACTITIONER

## 2024-12-04 PROCEDURE — 99214 OFFICE O/P EST MOD 30 MIN: CPT | Performed by: NURSE PRACTITIONER

## 2024-12-04 PROCEDURE — 1126F AMNT PAIN NOTED NONE PRSNT: CPT | Performed by: NURSE PRACTITIONER

## 2024-12-04 PROCEDURE — 1159F MED LIST DOCD IN RCRD: CPT | Performed by: NURSE PRACTITIONER

## 2024-12-04 RX ORDER — ONDANSETRON 8 MG/1
8 TABLET, FILM COATED ORAL 3 TIMES DAILY PRN
Qty: 30 TABLET | Refills: 5 | Status: SHIPPED | OUTPATIENT
Start: 2024-12-10

## 2024-12-04 RX ORDER — DEXAMETHASONE 4 MG/1
TABLET ORAL
Qty: 12 TABLET | Refills: 3 | Status: SHIPPED | OUTPATIENT
Start: 2024-12-10

## 2024-12-04 NOTE — PROGRESS NOTES
Outpatient Infusion  [address] [place] [address]  [phone]      CHEMOTHERAPY EDUCATION    NAME:  Eliane Argueta      : 1990           DATE: 24    Medication Education Sheets: (select all that apply)  Cyclophosphamide and Docetaxel    Other Education Sheets: (select all that apply)  CINV, Diarrhea, and Symptom Tracker Sheet and MASOOD Information    Chemotherapy Regimen:   OP BREAST TC DOCEtaxel / Cyclophosphamide IV every 21 days for 4 cycles  -docetaxel 75 mg/m2 IV on day 1  -cyclophosphamide 600 mg/m2 IV on day 1  -filgrastim (Neupogen) injections - Inject one syringe subQ daily for 5 days starting the day after chemo    TOPICS EDUCATION PROVIDED COMMENTS   ANEMIA:  role of RBC, cause, s/s, ways to manage, role of transfusion [x] Reviewed the role of RBC and the use of transfusions if hemoglobin decreases too much.  Patient to notify us if she experiences shortness of breath, dizziness, or palpitations.  Also let patient know she could feel more tired than usual and to try to stay active, but rest if she needs to.    THROMBOCYTOPENIA:  role of platelet, cause, s/s, ways to prevent bleeding, things to avoid, when to seek help [x] Reviewed the role of platelets in blood clotting and when to call clinic (bloody nose that bleeds for 5 minutes despite pressure, a cut that won't stop bleeding despite pressure, gums that bleed excessively with brushing or flossing). Recommended using an electric razor, soft bristle toothbrush, and blowing the nose gently.    NEUTROPENIA:  role of WBC, cause, infection precautions, s/s of infection, when to call MD [x] Reviewed the role of WBC, good infection prevention practices, and when to call the clinic (temperature 100.4F, sore throat, burning urination, etc)   DIARRHEA:  causes, s/s of dehydration, ways to manage, dietary changes, when to call MD [x] Chemotherapy : Discussed the risk of diarrhea. Instructed patient on use OTC  loperamide with diarrhea onset, but emphasized the importance of calling the clinic if 4-6 episodes in 24 hours not relieved by OTC loperamide.   NAUSEA & VOMITING:  cause, use of antiemetics, dietary changes, when to call MD [x] Emetic risk: Moderate  Premeds: Palonosetron IV, home dexamethasone  PRN home meds: Ondansetron 8 mg PO q 8 hours PRN for N/V  Pharmacy home meds sent to: Prescription has not been sent yet. The patient has a needs assessment with Crystal today.     Instructed the patient to take a dose of the PRN medication at the first onset of nausea and if it's not working to call us for additional medications.  Also provided non-drug measures to mitigate nausea.   MOUTH SORES:  causes, oral care, ways to manage [x] The patient was encouraged to make a mouth wash mixture of salt, baking soda, and water or to use a non-alcohol containing OTC mouthwash to swish and spit four times daily after meals and before bedtime, with the goal of preventing mouth sores and keeping the mouth clean.  Use of a soft bristle toothbrush was recommended.  The patient was instructed to avoid alcohol-containing OTC mouthwashes.   ALOPECIA:  cause, ways to manage, resources [x] Discussed the possibility of hair loss with the patient. Informed patient that she could request a prescription for a wig if desired and most of the cost is usually covered by insurance. Recommended covering the head with a hat and/or protecting the skin on the head with SPF 30 or higher.    NERVOUS SYSTEM CHANGES:  causes, s/s, neuropathies, cognitive changes, ways to manage [x] Peripheral Neuropathy - Discussed the adverse effect of peripheral neuropathy and signs/symptoms associated with this adverse effect. Instructed patient to call if symptoms become more frequent or worsen.    PAIN:  causes, ways to manage [x] Chemo: Discussed muscle and joint aches/pains with chemotherapy, and recommended the use of OTC pain relief with ibuprofen or acetaminophen  if needed. and Growth Factors: Discussed the possibility for bone pain with filgrastim, and reviewed the use of over-the-counter loratadine 10 mg by mouth at night on days 2-8 of each cycle to help manage this side effect.   SKIN & NAIL CHANGES:  cause, s/s, ways to manage [x] Fingernails/Toenails: Informed the patient of the possibility for dark or white lines on finger and toenails during treatment, and that nails may become brittle and even fall off in extreme cases. This will likely reverse after treatment concludes. , Generalized Rash: Discussed the potential for a rash or itchy skin, offered nonpharmacologic prevention strategies, and instructed the patient to call if a rash develops and worsens.   ORGAN TOXICITIES:  cause, s/s, need for diagnostic tests, labs, when to notify MD [x] Discussed potential effects on organ systems, monitoring, diagnostic tests, labs, and when to notify the clinic. Discussed the signs/symptoms of the following: lung changes.   INFUSION RELATED REACTIONS or INJECTION-SITE REACTION:  Cause, s/s, anaphylaxis, monitoring, etc. [x] Premeds:  home dexamethasone    Discussed the risk of an infusion reaction and symptoms such as: fever, chills, dizziness, itchiness or rash, flushing, trouble breathing, wheezing, sudden back pain, or feeling faint.  Instructed the patient to notify her nurse if she starts feeling weird at any point during her infusion. Reviewed how infusion reactions are managed.   SURVIVORSHIP:  distress, distress assessment, secondary malignancies, early/late effects, follow-up, social issues, social support [x] Discussed the rare, but possible risk of secondary malignancies months to years after treatment, most commonly acute myeloid leukemia.   MISCELLANEOUS:  drug interactions, administration, labs, etc. [x] Discussed chemotherapy schedule, lab draws, infusion times, and total expected visit time.   DDIs: No significant DDIs  Lab draws: On or before day 1 of each  cycle, no sooner than 3 days early.  Reviewed the possibility for hemorrhagic cystitis and emphasized the importance of adequate hydration and frequent voiding of the bladder.  Fluid Retention: Explained the signs/symptoms of fluid retention around ankles, feet, and possibly in the hands.  Reviewed strategies to minimize this and recommended that the patient call the clinic if she gains 5 or more pounds in 1 week or experiences shortness of breath without exertion.  Discussed use of dexamethasone as prevention - dexamethasone 4 mg tablets - Take 2 tablets by mouth twice daily with food (breakfast and lunch) the day before, the day of, and the day after chemotherapy to prevent fluid retention.  Prescription sent to Prescription has not been sent yet. The patient has a needs assessment with Crystal today.   I provided the patient with a Neupogen guide during education since her injections will be given at home. I counseled the patient to store Neupogen syringes in the refrigerator.    INFERTILITY & SEXUALITY:    causes, fertility preservation options, sexuality changes, ways to manage, importance of birth control [x] IV Oncology Therapy: Reviewed safe sex practices and the importance of minimizing exposure to body fluids for 48 hours after each dose of IV oncology therapy., The patient is not of childbearing potential. The patient reports she has regular periods but has had a tubal ligation procedure.    HOME CARE:  storing of oral chemo, how to manage bodily fluids [x] IV - Counseled on management of soiled linens and proper flush technique.  Discussed how to manage all the side effects at home and advised when to contact the MD office     Medications:  Prior to Admission medications    Medication Sig Start Date End Date Taking? Authorizing Provider   diazePAM (VALIUM) 5 MG tablet Take 1 tablet every 12 hours by oral route as needed.    Provider, MD Darío   Filgrastim (Neupogen) 480 MCG/0.8ML solution prefilled  syringe injection Patient to inject 480mcg (0.8 ml) subcutaneous daily starting day after chemo for 10 days. Chemotherapy regimen is every 21 days. 11/22/24   Evelia Galvan APRN   naproxen sodium (ANAPROX) 550 MG tablet  9/6/24   Provider, MD Darío     Notes: All questions and concerns were addressed. Provided a personalized treatment calendar to patient (includes treatment and lab schedule). Provided patient with contact information for the pharmacist and clinic while instructing them to call if any questions or concerns arise. Informed consent for treatment was obtained. Patient was receptive to information and expressed understanding.     Kaela Lafleur, PharmD  Clinic Oncology Pharmacy Specialist  [phone]    12/4/2024  09:45 EST

## 2024-12-04 NOTE — PROGRESS NOTES
CHEMOTHERAPY PREPARATION    Eliane Argueta  [id number]  1990    Chief Complaint: Treatment preparation and needs assessment    History of present illness:  Eliane Argueta is a 34 y.o. year old female who is here today with her daughter for treatment preparation and needs assessment.  The patient has been diagnosed with breast cancer.  She is status post bilateral mastectomy and is scheduled to begin adjuvant treatment with DOCEtaxel / Cyclophosphamide.  She has her second post op appointment this afternoon with Dr. MCKAY.  Her mastectomy site is healing well without any redness or drainage noted.      Oncology History:    Oncology/Hematology History   Carcinoma of upper-inner quadrant of female breast, left   11/12/2024 Initial Diagnosis    Carcinoma of upper-inner quadrant of female breast, left     12/11/2024 -  Chemotherapy    OP BREAST TC DOCEtaxel / Cyclophosphamide         The current medication list and allergy list were reviewed and reconciled.     Past Medical History, Past Surgical History, Social History, Family History have been reviewed and are without significant changes except as mentioned.    Review of Systems:    Review of Systems   Constitutional:  Positive for fatigue.   Psychiatric/Behavioral:  Positive for sleep disturbance. The patient is nervous/anxious.      Physical Exam:    Vitals:    12/04/24 1122   BP: 128/58   Pulse: 86   Resp: 16   Temp: 97.5 °F (36.4 °C)   SpO2: 98%     Vitals:    12/04/24 1122   PainSc: 0-No pain          ECOG: (1) Restricted in Physically Strenuous Activity, Ambulatory & Able to Do Work of Light Nature    General: well appearing, in no acute distress  Cardiovascular: regular rate and rhythm, no murmurs noted  Lungs: clear to auscultation bilaterally, respirations even and unlabored  Extremities: no lower extremity edema  Skin: bilateral mastectomy site well healed without any erythema or drainage   Psych: Mood is stable          NEEDS  ASSESSMENTS    Genetics  The patient's new diagnosis and family history have been reviewed for genetic counseling needs.     Psychosocial  The patient has completed a PHQ-9 Depression Screening and the Distress Thermometer (DT) today.   PHQ-9 results show 15-19 (Moderately Severe Depression). The patient scored their distress today as 4 on a scale of 0-10 with 0 being no distress and 10 being extreme distress.   Problems marked by the patient as being an issue for them within the last week include practical problems, family problems, emotional problems, and physical problems.   Results were reviewed along with psychosocial resources offered by our cancer center.  Our oncology social worker will be flagged for a DT score of 4 or above, and a same day call will be made for a score of 9 or 10.  A mental health referral is offered at this time. The patient is not interested in a referral to [facility].  She has a history of depression and has been on antidepressants in the past.  She is more comfortable at this time having her PCP manage her depression as she has in the past.  Copies of patient's questionnaires will be scanned into EMR for details and further reference.    Barriers to care  A barriers form was also completed by the patient today. We discussed services offered by our facility to help her have adequate access to care. The patient was given the name and contact information for our Oncology Social Worker, Destiny Desai.  Based upon barriers assessment today, the patient will require a follow-up call from the  to further discuss needs.  Referral has been placed as patient has concerns regarding transportation for treatments and also radiation in the future.  A copy of the barriers form will also be scanned into EMR for details and further reference.     VAD Assessment  The patient and I discussed planned intervenous chemotherapy as well as other IV treatments that are often needed  "throughout the course of treatment. These may include, but are not limited to blood transfusions, antibiotics, and IV hydration. The vasculature does appear to be adequate for multiple peripheral IVs throughout their treatment course.     Advanced Care Planning  The patient and I discussed advanced care planning, \"Conversations that Matter\".   This service was offered, free of charge, for development of advance directives with a certified ACP facilitator.  The patient does not have an up-to-date advanced directive. This document is not on file with our office. The patient is not interested in an appointment with one of our facilitators to create or update their advanced directives.      Palliative Care  The patient and I discussed palliative care services. Palliative care is not the same as Hospice care. This is specialized medical care for people living with serious illness with the goal of improving quality of life for the patient and their family. [facility] offers our patients outpatient palliative care early along with their treatment to assist in coordination of care, symptom management, pain management, and medical decision making.  Oncology criteria for palliative care referral is not met at this time. The patient is not interested in a palliative care consultation.     Additional Referral needs  none      CHEMOTHERAPY EDUCATION    Chemotherapy education completed and consent obtained per oncology pharmacist.  See their documentation for further details.    Booklets Given: Chemotherapy and You [x]  Nutrition for the Patient with Cancer During Treatment [x]    Sexuality/Fertility Books []     Chemotherapy Regimen:   Treatment Plans       Name Type Plan Dates Plan Provider         Active    OP BREAST TC DOCEtaxel / Cyclophosphamide ONCOLOGY TREATMENT 12/4/2024 - Present Angela Horvath MD                    Chemotherapy education comprehension reviewed. Questions answered.    Assessment and Plan:    Diagnoses and " all orders for this visit:    1. Carcinoma of upper-inner quadrant of female breast, left (Primary)      The patient and I have reviewed their cancer diagnosis and scheduled treatment plan. Needs assessment was completed including genetics, psychosocial needs, barriers to care, VAD evaluation, advanced care planning, and palliative care services. Referrals have been ordered as appropriate based upon our evaluation and patient desires.     Chemotherapy teaching was also completed today per pharmacy.  Adequate time was given to answer questions.  Patient and family are aware of their care team members and contact information if they have questions or problems throughout the treatment course. The patient is adequately prepared to begin treatment as scheduled.     Reviewed with patient education regarding neupogen, dexamethasone and Zofran prescriptions sent to pharmacy.  Reviewed administration of Neupogen shots with patient and she verbalizes understanding.  She is anxious about giving herself a shot but states she is aware of the procedure and can do it.  She has a lot of family members that are diabetic.  She thinks she may have her  give her the shots.     Patient will have pretreatment labs drawn next week.      I spent 30 minutes caring for Eliane on this date of service. This time includes time spent by me in the following activities: preparing for the visit, reviewing tests, performing a medically appropriate examination and/or evaluation, counseling and educating the patient/family/caregiver, ordering medications, tests, or procedures, documenting information in the medical record, and care coordination.     Crystal Sosa, [facility]  12/04/24

## 2024-12-11 ENCOUNTER — DOCUMENTATION (OUTPATIENT)
Dept: OTHER | Facility: HOSPITAL | Age: 34
End: 2024-12-11
Payer: MEDICAID

## 2024-12-11 ENCOUNTER — DOCUMENTATION (OUTPATIENT)
Dept: NUTRITION | Facility: HOSPITAL | Age: 34
End: 2024-12-11
Payer: MEDICAID

## 2024-12-11 ENCOUNTER — HOSPITAL ENCOUNTER (OUTPATIENT)
Dept: ONCOLOGY | Facility: HOSPITAL | Age: 34
Discharge: HOME | End: 2024-12-11
Admitting: INTERNAL MEDICINE
Payer: MEDICAID

## 2024-12-11 VITALS
HEART RATE: 78 BPM | DIASTOLIC BLOOD PRESSURE: 58 MMHG | SYSTOLIC BLOOD PRESSURE: 122 MMHG | WEIGHT: 201 LBS | HEIGHT: 67 IN | BODY MASS INDEX: 31.55 KG/M2 | RESPIRATION RATE: 16 BRPM | TEMPERATURE: 97.8 F

## 2024-12-11 DIAGNOSIS — C50.212 CARCINOMA OF UPPER-INNER QUADRANT OF FEMALE BREAST, LEFT: Primary | ICD-10-CM

## 2024-12-11 DIAGNOSIS — Z51.11 ENCOUNTER FOR ANTINEOPLASTIC CHEMOTHERAPY: ICD-10-CM

## 2024-12-11 LAB
ALBUMIN SERPL-MCNC: 4.6 G/DL (ref 3.5–5.2)
ALBUMIN/GLOB SERPL: 1.4 G/DL
ALP SERPL-CCNC: 94 U/L (ref 39–117)
ALT SERPL W P-5'-P-CCNC: 15 U/L (ref 1–33)
ANION GAP SERPL CALCULATED.3IONS-SCNC: 14 MMOL/L (ref 5–15)
AST SERPL-CCNC: 15 U/L (ref 1–32)
B-HCG UR QL: NEGATIVE
BASOPHILS # BLD AUTO: 0.02 10*3/MM3 (ref 0–0.2)
BASOPHILS NFR BLD AUTO: 0.1 % (ref 0–1.5)
BILIRUB SERPL-MCNC: 0.2 MG/DL (ref 0–1.2)
BUN SERPL-MCNC: 9 MG/DL (ref 6–20)
BUN/CREAT SERPL: 11.8 (ref 7–25)
CALCIUM SPEC-SCNC: 9.4 MG/DL (ref 8.6–10.5)
CHLORIDE SERPL-SCNC: 105 MMOL/L (ref 98–107)
CO2 SERPL-SCNC: 22 MMOL/L (ref 22–29)
CREAT SERPL-MCNC: 0.76 MG/DL (ref 0.57–1)
DEPRECATED RDW RBC AUTO: 40.4 FL (ref 37–54)
EGFRCR SERPLBLD CKD-EPI 2021: 104.9 ML/MIN/1.73
EOSINOPHIL # BLD AUTO: 0.01 10*3/MM3 (ref 0–0.4)
EOSINOPHIL NFR BLD AUTO: 0 % (ref 0.3–6.2)
ERYTHROCYTE [DISTWIDTH] IN BLOOD BY AUTOMATED COUNT: 12.2 % (ref 12.3–15.4)
GLOBULIN UR ELPH-MCNC: 3.3 GM/DL
GLUCOSE SERPL-MCNC: 138 MG/DL (ref 65–99)
HCT VFR BLD AUTO: 36 % (ref 34–46.6)
HGB BLD-MCNC: 12 G/DL (ref 12–15.9)
IMM GRANULOCYTES # BLD AUTO: 0.03 10*3/MM3 (ref 0–0.05)
IMM GRANULOCYTES NFR BLD AUTO: 0.1 % (ref 0–0.5)
LYMPHOCYTES # BLD AUTO: 1.07 10*3/MM3 (ref 0.7–3.1)
LYMPHOCYTES NFR BLD AUTO: 5 % (ref 19.6–45.3)
MCH RBC QN AUTO: 29.9 PG (ref 26.6–33)
MCHC RBC AUTO-ENTMCNC: 33.3 G/DL (ref 31.5–35.7)
MCV RBC AUTO: 89.6 FL (ref 79–97)
MONOCYTES # BLD AUTO: 0.63 10*3/MM3 (ref 0.1–0.9)
MONOCYTES NFR BLD AUTO: 2.9 % (ref 5–12)
NEUTROPHILS NFR BLD AUTO: 19.67 10*3/MM3 (ref 1.7–7)
NEUTROPHILS NFR BLD AUTO: 91.9 % (ref 42.7–76)
PLATELET # BLD AUTO: 348 10*3/MM3 (ref 140–450)
PMV BLD AUTO: 9.1 FL (ref 6–12)
POTASSIUM SERPL-SCNC: 4.3 MMOL/L (ref 3.5–5.2)
PROT SERPL-MCNC: 7.9 G/DL (ref 6–8.5)
RBC # BLD AUTO: 4.02 10*6/MM3 (ref 3.77–5.28)
SODIUM SERPL-SCNC: 141 MMOL/L (ref 136–145)
WBC NRBC COR # BLD AUTO: 21.43 10*3/MM3 (ref 3.4–10.8)

## 2024-12-11 PROCEDURE — 25010000002 CYCLOPHOSPHAMIDE 2 GM/10ML SOLUTION 10 ML VIAL: Performed by: INTERNAL MEDICINE

## 2024-12-11 PROCEDURE — 25010000002 PALONOSETRON PER 25 MCG: Performed by: INTERNAL MEDICINE

## 2024-12-11 PROCEDURE — 85025 COMPLETE CBC W/AUTO DIFF WBC: CPT | Performed by: INTERNAL MEDICINE

## 2024-12-11 PROCEDURE — 25010000002 DOCETAXEL 20 MG/ML SOLUTION 8 ML VIAL: Performed by: INTERNAL MEDICINE

## 2024-12-11 PROCEDURE — 80053 COMPREHEN METABOLIC PANEL: CPT | Performed by: INTERNAL MEDICINE

## 2024-12-11 PROCEDURE — 96417 CHEMO IV INFUS EACH ADDL SEQ: CPT

## 2024-12-11 PROCEDURE — 81025 URINE PREGNANCY TEST: CPT | Performed by: INTERNAL MEDICINE

## 2024-12-11 PROCEDURE — 25810000003 SODIUM CHLORIDE 0.9 % SOLUTION 250 ML FLEX CONT: Performed by: INTERNAL MEDICINE

## 2024-12-11 PROCEDURE — 96413 CHEMO IV INFUSION 1 HR: CPT

## 2024-12-11 PROCEDURE — 25810000003 SODIUM CHLORIDE 0.9 % SOLUTION: Performed by: INTERNAL MEDICINE

## 2024-12-11 PROCEDURE — 96375 TX/PRO/DX INJ NEW DRUG ADDON: CPT

## 2024-12-11 RX ORDER — HYDROCORTISONE SODIUM SUCCINATE 100 MG/2ML
100 INJECTION INTRAMUSCULAR; INTRAVENOUS AS NEEDED
Status: CANCELLED | OUTPATIENT
Start: 2024-12-11

## 2024-12-11 RX ORDER — DIPHENHYDRAMINE HYDROCHLORIDE 50 MG/ML
50 INJECTION INTRAMUSCULAR; INTRAVENOUS AS NEEDED
Status: CANCELLED | OUTPATIENT
Start: 2024-12-11

## 2024-12-11 RX ORDER — DOCUSATE SODIUM 100 MG/1
100 CAPSULE, LIQUID FILLED ORAL 2 TIMES DAILY PRN
Qty: 60 CAPSULE | Refills: 1 | Status: SHIPPED | OUTPATIENT
Start: 2024-12-11

## 2024-12-11 RX ORDER — POLYETHYLENE GLYCOL 3350 17 G/17G
17 POWDER, FOR SOLUTION ORAL DAILY
Qty: 116 G | Refills: 2 | Status: SHIPPED | OUTPATIENT
Start: 2024-12-11

## 2024-12-11 RX ORDER — FAMOTIDINE 10 MG/ML
20 INJECTION, SOLUTION INTRAVENOUS AS NEEDED
Status: CANCELLED | OUTPATIENT
Start: 2024-12-11

## 2024-12-11 RX ORDER — PALONOSETRON 0.05 MG/ML
0.25 INJECTION, SOLUTION INTRAVENOUS ONCE
Status: CANCELLED | OUTPATIENT
Start: 2024-12-11

## 2024-12-11 RX ORDER — SODIUM CHLORIDE 9 MG/ML
20 INJECTION, SOLUTION INTRAVENOUS ONCE
Status: COMPLETED | OUTPATIENT
Start: 2024-12-11 | End: 2024-12-11

## 2024-12-11 RX ORDER — LORATADINE 10 MG/1
10 TABLET ORAL DAILY
Qty: 30 TABLET | Refills: 2 | Status: SHIPPED | OUTPATIENT
Start: 2024-12-11

## 2024-12-11 RX ORDER — PALONOSETRON 0.05 MG/ML
0.25 INJECTION, SOLUTION INTRAVENOUS ONCE
Status: COMPLETED | OUTPATIENT
Start: 2024-12-11 | End: 2024-12-11

## 2024-12-11 RX ORDER — SODIUM CHLORIDE 9 MG/ML
20 INJECTION, SOLUTION INTRAVENOUS ONCE
Status: CANCELLED | OUTPATIENT
Start: 2024-12-11

## 2024-12-11 RX ADMIN — SODIUM CHLORIDE 1210 MG: 9 INJECTION, SOLUTION INTRAVENOUS at 14:43

## 2024-12-11 RX ADMIN — PALONOSETRON HYDROCHLORIDE 0.25 MG: 0.25 INJECTION INTRAVENOUS at 12:35

## 2024-12-11 RX ADMIN — DOCETAXEL 150 MG: 20 INJECTION, SOLUTION, CONCENTRATE INTRAVENOUS at 13:27

## 2024-12-11 RX ADMIN — SODIUM CHLORIDE 20 ML/HR: 9 INJECTION, SOLUTION INTRAVENOUS at 12:35

## 2024-12-11 NOTE — PROGRESS NOTES
"Outpatient Oncology Nutrition     Reason for Visit: Oncology Nutrition Screening and Patient Education / Met with patient and her  during her initial chemotherapy infusion appointment.     Patient Name:  Eliane Argueta    :  1990    MRN:  [id number]    Date of Encounter: 2024    Nutrition Assessment     Diagnosis: ER positive HER2 negative invasive ductal carcinoma of the left breast    Surgery: Bilateral mastectomy - 24 / Pathology showed a 3.5 cm high grade tumor / 1/2 SLN involved / 2 additional axillary LN negative for metastatic carcinoma     Chemotherapy: OP BREAST TC DOCEtaxel / Cyclophosphamide IV - every 21 days x 4 cycles     Radiation: will sequence RT after chemotherapy if she chooses to have it and will coordinate with her plastic surgeon to coordinate radiation with her reconstructive plan     Patient Active Problem List:    Patient Active Problem List   Diagnosis    Carcinoma of upper-inner quadrant of female breast, left       Food / Nutrition Related History       Hydration Status   Discussed the importance of hydration, reviewed hydrating fluids, and recommended she increase her intake.    Goal: ~96 ounces     How many 8 ounces glasses of water do you consume per day? Patient reports drinking at least 2 cups of coffee in the morning, 1 Dr. Pepper with lunch, then drinks water the rest of the day.     Enteral Feeding       Anthropometric Measurements     Height:    Ht Readings from Last 1 Encounters:   24 170.2 cm (67\")       Weight:    Wt Readings from Last 1 Encounters:   24 91.2 kg (201 lb)       BMI: 31.5 - Obese    Weight Change: stable range     Review of Lab Data (Time Frame - 1 month / 2 month)   Labs reviewed - 24 & 24 - HbA1c - 5.2    Medication Review   MAR reviewed - Dexamethasone and Zofran noted     Nutrition Focused Physical Findings       Nutrition Impact Symptoms   No problems with eating at this time     Physical Activity   Not my " "normal self, but able to be up and about with fairly normal activities    Current Nutritional Intake     Oral diet:  Regular     Intake: oral intake has been normal     Malnutrition Risk Assessment     Recent weight loss over the past 6 months:  0 = No    Eating poorly because of a decreased appetite:  0 = No    Malnutrition Screening Score:     MST = 0 or 1 Patient not at risk for malnutrition    Nutrition Diagnosis     Problem    Etiology    Signs / Symptoms      Nutrition Intervention   Discussed the importance of good nutrition during her treatment course focusing on adequate calorie, protein, nutrient and fluid intake.  Advised her to be consuming smaller more frequent meals/snacks throughout the day to aid with potential nausea management.  Emphasized the importance of protein and its role in the diet; reviewed high protein foods; and recommended she have a protein source at each meal/snack.  Provided and reviewed written diet materials \"Soft and Moist High Protein Menu Ideas\" and \"High-Protein Foods List\" to reinforce information discussed.  Also discussed tips to aid with potential nutrition related impact symptoms such as taste changes, nausea, and constipation.      Provided and reviewed written diet material \"Nutritional Considerations in Breast Cancer\" and discussed the basics of survivorship nutrition.       Goal   To achieve adequate nutritional and hydration intake.  To aid with nutrition impact symptom management as needed.     Monitoring / Evaluation   Answered their questions and both voiced understanding of information discussed.  RD's contact information provided and encouraged to call with questions.  Will follow up as indicated.     Teresita Rosa MS, RD, LD   "

## 2024-12-11 NOTE — PROGRESS NOTES
Distress Screening Follow-up    Name: Eliane Argueta    : 1990    Diagnosis: Carcinoma of upper-inner quadrant of female breast, left     Location of Distress Screening: Infusion    Distress Level: 4 (2024 12:53 PM)    Physical Concerns:  Sleep: Y  Substance abuse: N  Fatigue: N  Tobacco use: N  Memory or concentration: Y  Sexual health: N  Changes in eating: Y  Pain: N      Emotional Concerns:  Worry or anxiety: Y  Sadness or depression: Y  Loss of interest or enjoyment: N  Grief or loss: N  Fear: Y  Loneliness: N  Anger: N  Changes in appearance: Y  Feelings of worthlessness or being a burden: N      Social Concerns:  Relationship with spouse or partner: N  Relationship with children: N  Relationship with family members: N  Relationship with friends or coworkers: N  Communication with health care team: N  Ability to have children: N      Practical Concerns:  Taking care of myself: Y  Taking care of others: Y  Work: Y  School: N  Housing: Y  Finances: Y  Insurance: N  Transportation: Y  : N  Having enough food: N  Access to medicine: N  Treatment decisions: N      Spiritual Concerns:  Sense of meaning or purpose: N  Changes in albert or beliefs: N  Death, dying or afterlife: N  Conflict between beliefs and cancer treatments: N  Relationship with the sacred: N  Ritual or dietary needs: N     Interventions:  Transportation Needs: gas cards ([employer] $45 voucher)  Palliative Care: advance care planning  Practical Needs: Food; Utilities ([employer] food card, [employer] aid)    Comments:  SW met with pt in infusion to provide support and assistance  with psychosocial needs. Pt was accompanied to appointment by her supportive . Pt was originally since at local hospital in [place]. She reported that her SW has applied to [employer] for her and [place] [employer] for gas card. SW praised pt for establishing care with her local SW and having her assist  with resources. Pt was in good spirits, but reported she is nervous about tx and unknown of side-effects. SW normalized her emotions and offered support.   Pt and her  have two teenager children at home 14 and 15 year olds. Pt reported they have great support with family and friends. Pt's  works in construction, and plans to attend chemotherapy visits with her. Pt has worked in factories, but with diagnosis, she wants to complete chemotherapy before pursuing work. SW praised pt for doing so given side-effects of tx. SW discussed role and resources with pt and her spouse. Pt and spouse were interested in more information about [facility] tx. Pt is 40 miles away, therefore SW provided education on [facility] and referral process. SW and pt will discuss more when radiation planning happens.Pt was agreeable to plan. SW inquired about other needs. Pt stated greatest need is transportation assistance. SW provided education on transportation vouchers from [employer] and offered to provide voucher on this date. They were agreeable and asked if it could be dated for 12/13. SW provided $45 [employer] voucher dated for 12/13. Pt stated they did just pay mortgage so finances are tight at this time. SW provided education on resources available for assistance with other bills, food, and gas. Pt stated they did have [employer] bill for this month that could use assistance. Sw asked if they had applied to [employer], to which she denied. Pt sent bill via email and Sw will submit application for assistance with [employer] Bill. SW offered [employer] card as well to assist with food costs. Pt and her  expressed appreciation and were accepting of all assistance.   Lastly, pt asked if we had notaries present to assist with completion of ACP.  SW confirmed and asked her to bring document to next infusion. SW also provided her spouse with document to complete. Pt and her spouse were  agreeable to this plan. SW will notarize and submit to pt's chart at next visit. SW provided contact information to pt and her spouse and will be available for ongoing support.

## 2024-12-14 ENCOUNTER — NURSE TRIAGE (OUTPATIENT)
Dept: CALL CENTER | Facility: HOSPITAL | Age: 34
End: 2024-12-14
Payer: MEDICAID

## 2024-12-15 NOTE — TELEPHONE ENCOUNTER
"Caller advises that the back of her tongue feels swollen. Advises that she has been started on Chemo and Neupogen treatments and wants to know if these medications can cause this. Denies SOB or any other symptoms at present. Advised caller to try OTC allergy medicine or Benadryl to see if that would help. Also advised that she follow up with Oncologist to have tongue examined for oral thrush. Pt. Has been on Decadron PO prior to Chemo treatments. Also advised if swelling becomes worse and or SOA develops to go to ED or call EMS. Verbalizes understanding.       Reason for Disposition   [1] Tongue swelling is a recurrent problem (e.g., comes and goes) AND [2] no swelling at present    Additional Information   Negative: Started suddenly after sting from bee, wasp, or yellow jacket   Negative: Started suddenly after taking a medicine or allergic food (e.g., nuts)   Negative: Wheezing, stridor, hoarseness, or difficulty breathing   Negative: Facial swelling   Negative: Neck swelling   Negative: Can't swallow normal secretions (e.g., drooling or spitting)   Negative: Taking an ACE Inhibitor medicine (e.g., benazepril / LOTENSIN, captopril / CAPOTEN, enalapril / VASOTEC, lisinopril / ZESTRIL)   Negative: Sounds like a life-threatening emergency to the triager   Negative: Followed a tongue injury   Negative: Pain in tongue, mouth, or tooth   Negative: All other adults with tongue swelling  (Exception: Tongue swelling is a recurrent problem AND NO swelling at present.)    Answer Assessment - Initial Assessment Questions  1. ONSET: \"When did the swelling start?\" (e.g., minutes, hours, days)      Today  2. LOCATION: \"What part of the tongue is swollen?\"      Back  3. SEVERITY: \"How swollen is it?\"      Mild  4. CAUSE: \"What do you think is causing the tongue swelling?\" (e.g., history of angioedema, allergies)      Unsure  5. RECURRENT SYMPTOM: \"Have you had tongue swelling before?\" If Yes, ask: \"When was the last time?\" \"What " "happened that time?\"      No  6. OTHER SYMPTOMS: \"Do you have any other symptoms?\" (e.g., breathing difficulty, facial swelling)      Denies  7. PREGNANCY: \"Is there any chance you are pregnant?\" \"When was your last menstrual period?\"      Na    Protocols used: Tongue Swelling-ADULT-AH    "

## 2024-12-16 ENCOUNTER — TELEPHONE (OUTPATIENT)
Dept: ONCOLOGY | Facility: CLINIC | Age: 34
End: 2024-12-16
Payer: MEDICAID

## 2024-12-16 NOTE — TELEPHONE ENCOUNTER
I called patient today per the triage call from [place] nurse over the weekend.  I got the patient voicemail and asked her to call me back to discuss her symptoms and see if we need to do interventions.

## 2024-12-17 ENCOUNTER — TELEPHONE (OUTPATIENT)
Dept: ONCOLOGY | Facility: CLINIC | Age: 34
End: 2024-12-17
Payer: MEDICAID

## 2024-12-17 DIAGNOSIS — Z17.0 MALIGNANT NEOPLASM OF LEFT BREAST IN FEMALE, ESTROGEN RECEPTOR POSITIVE, UNSPECIFIED SITE OF BREAST: ICD-10-CM

## 2024-12-17 DIAGNOSIS — Z51.11 ENCOUNTER FOR ANTINEOPLASTIC CHEMOTHERAPY: ICD-10-CM

## 2024-12-17 DIAGNOSIS — C50.212 CARCINOMA OF UPPER-INNER QUADRANT OF FEMALE BREAST, LEFT: Primary | ICD-10-CM

## 2024-12-17 DIAGNOSIS — C50.912 MALIGNANT NEOPLASM OF LEFT BREAST IN FEMALE, ESTROGEN RECEPTOR POSITIVE, UNSPECIFIED SITE OF BREAST: ICD-10-CM

## 2024-12-17 NOTE — TELEPHONE ENCOUNTER
Patient called she said she is having side effects from the Neupogen shot it hurts so much she can't even have anything touch it. She wants to know if there is something else she could try? Please call.

## 2024-12-17 NOTE — TELEPHONE ENCOUNTER
I called patient back and had long discussion about her symptoms after chemo last week.  The neupogen shots are causing her bone pain that is challenging and she is frustrated and having a hard time doing activities due to the pain.  She also reports that she has early rheumatoid arthritis that is also affecting her pain.  I counseled her to take 1000mg of tylenol alternating with 800mg of ibuprofen and she can dose with something every 6 hours.  Patient will get her labs done tomorrrow, Dec. 18 as it will be day 7 and she will be in her jesus.  Depending on the white blood cell count, the patient may need to continue her neupogen for additional days.  We have asked auth team to see if they can resubmit for neulasta approval and the auth team did respond and say that medicaid will not approve neulasta but will approve fylnetra which is a new biosimilar for neulasta.  I told patient I would notify her tomorrow to let her know if she is to continue the neupogen shots.  Patient verbalized understanding.

## 2024-12-18 ENCOUNTER — TELEPHONE (OUTPATIENT)
Dept: ONCOLOGY | Facility: CLINIC | Age: 34
End: 2024-12-18
Payer: MEDICAID

## 2024-12-18 DIAGNOSIS — C50.212 CARCINOMA OF UPPER-INNER QUADRANT OF FEMALE BREAST, LEFT: Primary | ICD-10-CM

## 2024-12-18 NOTE — TELEPHONE ENCOUNTER
I called patient per Dr. Horvath to let her know that the lab result shows an adequate white count and anc but the patient is just at day 8 of jesus.  Dr. Horvath would like for patient to repeat labs on Monday to ensure that white count is still okay.  Patient will go to hospital lab in [facility] Monday to have repeat CBC with Diff.  She verbalized understanding.

## 2024-12-19 ENCOUNTER — TELEPHONE (OUTPATIENT)
Dept: ONCOLOGY | Facility: CLINIC | Age: 34
End: 2024-12-19
Payer: MEDICAID

## 2024-12-19 NOTE — TELEPHONE ENCOUNTER
I called [facility] to get the fax number to registration and faxed order to registration at [phone].  Patient to go Monday, Dec. 23 to have cbc checked and is aware.

## 2024-12-23 ENCOUNTER — TELEPHONE (OUTPATIENT)
Dept: ONCOLOGY | Facility: CLINIC | Age: 34
End: 2024-12-23
Payer: MEDICAID

## 2024-12-23 NOTE — TELEPHONE ENCOUNTER
I called patient per Dr Horvath to tell her that her wbc and her neutrophil count is sufficient and she does not have to do any more neupogen shots.  She had taken the injection for 5 days after chemo but we wanted to make sure that her counts maintained throughout her jesus period.  She will be getting biosimilar neulasta injection the next cycles of chemo.  I left voicemail on patient's phone.

## 2024-12-31 NOTE — PROGRESS NOTES
PROBLEM LIST:  [id number] ER+ (70%, 3+) [place]+ (60%, 3+), Her2 negative (0+) invasive ductal carcinoma of the left breast  CT chest abdomen and pelvis and bone scan done at outside hospital showed no evidence of metastatic disease.  Bilateral mastectomy on 11/12/24.  Pathology showed a 3.5 cm high grade tumor.  1/2 SLN involved.  2 additional axillary LN negative for metastatic carcinoma.  Adjuvant chemotherapy with taxotere and cytoxan started 12/11/24.  Rheumatoid arthritis  anxiety    Subjective     CHIEF COMPLAINT: breast cancer    HISTORY OF PRESENT ILLNESS:   Eliane Argueta returns for follow-up.   She has had her first cycle of chemotherapy.  She had a lot of bone pain when she was taking the daily injections.  This improved within about 48 hours of stopping them.  She did have some nausea for the first week but this was manageable with Zofran.  No significant constipation or diarrhea.  She says she is not sleeping very well.      Objective      /67   Pulse 83   Temp 97.4 °F (36.3 °C) (Temporal)   Resp 18   Wt 90.6 kg (199 lb 11.2 oz)   SpO2 98%   BMI 31.28 kg/m²      Performance Status:  ECOG score: 0             General: well appearing female in no acute distress  Neuro: alert and oriented  HEENT: sclera anicteric, oropharynx clear  Lymphatics: no cervical, supraclavicular, or axillary adenopathy  Cardiovascular: regular rate and rhythm, no murmurs  Lungs: clear to auscultation bilaterally  Abdomen: soft, nontender, nondistended.  No palpable organomegaly  Extremeties: no lower extremity edema  Skin: no rashes, lesions, bruising, or petechiae  Psych: mood and affect appropriate    I have reexamined the patient and the results are consistent with the previously documented exam. Angela Horvath MD        RECENT LABS:  Lab Results   Component Value Date    WBC 15.95 (H) 01/02/2025    HGB 11.6 (L) 01/02/2025    HCT 34.3 01/02/2025    MCV 88.9 01/02/2025     (H) 01/02/2025       Lab Results    Component Value Date    GLUCOSE 141 (H) 01/02/2025    BUN 11 01/02/2025    CREATININE 0.71 01/02/2025    BCR 15.5 01/02/2025    K 4.3 01/02/2025    CO2 23.0 01/02/2025    CALCIUM 10.4 01/02/2025    ALBUMIN 4.8 01/02/2025    AST 18 01/02/2025    ALT 16 01/02/2025           ASSESSMENT AND PLAN:     Eliane Argueta is a 35 y.o. female with a T2 N1 M0 ER positive HER2 negative invasive ductal carcinoma of the left breast.     She will be receiving pegylated G-CSF with this and future cycles.    She did have bone pain related to the growth factor injections.  Hopefully this will be better with the long-acting version, but if it is not we can consider trying tramadol.    Insomnia: She has been using Valium 5 mg at night but this has not been working as well recently.  We discussed she can try taking 10 mg.    Depression: Recently started on Wellbutrin by her PCP.    After completion of chemotherapy, she is a candidate for radiation therapy, followed by endocrine therapy with ovarian suppression along with anastrozole, and the addition of Kisqali for 3 years.     Follow-up in 3 weeks.                  Angela Horvath MD  [facility] Hematology and Oncology    1/2/2025          CC:

## 2025-01-02 ENCOUNTER — HOSPITAL ENCOUNTER (OUTPATIENT)
Dept: ONCOLOGY | Facility: HOSPITAL | Age: 35
Discharge: HOME | End: 2025-01-02
Admitting: INTERNAL MEDICINE
Payer: MEDICAID

## 2025-01-02 ENCOUNTER — OFFICE VISIT (OUTPATIENT)
Dept: ONCOLOGY | Facility: CLINIC | Age: 35
End: 2025-01-02
Payer: MEDICAID

## 2025-01-02 VITALS
WEIGHT: 199.7 LBS | SYSTOLIC BLOOD PRESSURE: 105 MMHG | OXYGEN SATURATION: 98 % | TEMPERATURE: 97.4 F | RESPIRATION RATE: 18 BRPM | HEART RATE: 83 BPM | BODY MASS INDEX: 31.28 KG/M2 | DIASTOLIC BLOOD PRESSURE: 67 MMHG

## 2025-01-02 DIAGNOSIS — C50.212 CARCINOMA OF UPPER-INNER QUADRANT OF FEMALE BREAST, LEFT: Primary | ICD-10-CM

## 2025-01-02 LAB
ALBUMIN SERPL-MCNC: 4.8 G/DL (ref 3.5–5.2)
ALBUMIN/GLOB SERPL: 1.8 G/DL
ALP SERPL-CCNC: 89 U/L (ref 39–117)
ALT SERPL W P-5'-P-CCNC: 16 U/L (ref 1–33)
ANION GAP SERPL CALCULATED.3IONS-SCNC: 14 MMOL/L (ref 5–15)
AST SERPL-CCNC: 18 U/L (ref 1–32)
BASOPHILS # BLD AUTO: 0.03 10*3/MM3 (ref 0–0.2)
BASOPHILS NFR BLD AUTO: 0.2 % (ref 0–1.5)
BILIRUB SERPL-MCNC: 0.2 MG/DL (ref 0–1.2)
BUN SERPL-MCNC: 11 MG/DL (ref 6–20)
BUN/CREAT SERPL: 15.5 (ref 7–25)
CALCIUM SPEC-SCNC: 10.4 MG/DL (ref 8.6–10.5)
CHLORIDE SERPL-SCNC: 108 MMOL/L (ref 98–107)
CO2 SERPL-SCNC: 23 MMOL/L (ref 22–29)
CREAT SERPL-MCNC: 0.71 MG/DL (ref 0.57–1)
DEPRECATED RDW RBC AUTO: 40.7 FL (ref 37–54)
EGFRCR SERPLBLD CKD-EPI 2021: 113.2 ML/MIN/1.73
EOSINOPHIL # BLD AUTO: 0.01 10*3/MM3 (ref 0–0.4)
EOSINOPHIL NFR BLD AUTO: 0.1 % (ref 0.3–6.2)
ERYTHROCYTE [DISTWIDTH] IN BLOOD BY AUTOMATED COUNT: 12.5 % (ref 12.3–15.4)
GLOBULIN UR ELPH-MCNC: 2.7 GM/DL
GLUCOSE SERPL-MCNC: 141 MG/DL (ref 65–99)
HCT VFR BLD AUTO: 34.3 % (ref 34–46.6)
HGB BLD-MCNC: 11.6 G/DL (ref 12–15.9)
IMM GRANULOCYTES # BLD AUTO: 0.04 10*3/MM3 (ref 0–0.05)
IMM GRANULOCYTES NFR BLD AUTO: 0.3 % (ref 0–0.5)
LYMPHOCYTES # BLD AUTO: 1.16 10*3/MM3 (ref 0.7–3.1)
LYMPHOCYTES NFR BLD AUTO: 7.3 % (ref 19.6–45.3)
MCH RBC QN AUTO: 30.1 PG (ref 26.6–33)
MCHC RBC AUTO-ENTMCNC: 33.8 G/DL (ref 31.5–35.7)
MCV RBC AUTO: 88.9 FL (ref 79–97)
MONOCYTES # BLD AUTO: 0.77 10*3/MM3 (ref 0.1–0.9)
MONOCYTES NFR BLD AUTO: 4.8 % (ref 5–12)
NEUTROPHILS NFR BLD AUTO: 13.94 10*3/MM3 (ref 1.7–7)
NEUTROPHILS NFR BLD AUTO: 87.3 % (ref 42.7–76)
PLATELET # BLD AUTO: 520 10*3/MM3 (ref 140–450)
PMV BLD AUTO: 8.8 FL (ref 6–12)
POTASSIUM SERPL-SCNC: 4.3 MMOL/L (ref 3.5–5.2)
PROT SERPL-MCNC: 7.5 G/DL (ref 6–8.5)
RBC # BLD AUTO: 3.86 10*6/MM3 (ref 3.77–5.28)
SODIUM SERPL-SCNC: 145 MMOL/L (ref 136–145)
WBC NRBC COR # BLD AUTO: 15.95 10*3/MM3 (ref 3.4–10.8)

## 2025-01-02 PROCEDURE — 1126F AMNT PAIN NOTED NONE PRSNT: CPT | Performed by: INTERNAL MEDICINE

## 2025-01-02 PROCEDURE — 99214 OFFICE O/P EST MOD 30 MIN: CPT | Performed by: INTERNAL MEDICINE

## 2025-01-02 PROCEDURE — 80053 COMPREHEN METABOLIC PANEL: CPT | Performed by: INTERNAL MEDICINE

## 2025-01-02 PROCEDURE — 96375 TX/PRO/DX INJ NEW DRUG ADDON: CPT

## 2025-01-02 PROCEDURE — 96417 CHEMO IV INFUS EACH ADDL SEQ: CPT

## 2025-01-02 PROCEDURE — 85025 COMPLETE CBC W/AUTO DIFF WBC: CPT | Performed by: INTERNAL MEDICINE

## 2025-01-02 PROCEDURE — 25010000002 DOCETAXEL 20 MG/ML SOLUTION 8 ML VIAL: Performed by: INTERNAL MEDICINE

## 2025-01-02 PROCEDURE — 25810000003 SODIUM CHLORIDE 0.9 % SOLUTION: Performed by: INTERNAL MEDICINE

## 2025-01-02 PROCEDURE — 96413 CHEMO IV INFUSION 1 HR: CPT

## 2025-01-02 PROCEDURE — 25010000002 PALONOSETRON PER 25 MCG: Performed by: INTERNAL MEDICINE

## 2025-01-02 PROCEDURE — 25010000002 CYCLOPHOSPHAMIDE 2 GM/10ML SOLUTION 10 ML VIAL: Performed by: INTERNAL MEDICINE

## 2025-01-02 PROCEDURE — 25810000003 SODIUM CHLORIDE 0.9 % SOLUTION 250 ML FLEX CONT: Performed by: INTERNAL MEDICINE

## 2025-01-02 RX ORDER — PALONOSETRON 0.05 MG/ML
0.25 INJECTION, SOLUTION INTRAVENOUS ONCE
Status: CANCELLED | OUTPATIENT
Start: 2025-01-02

## 2025-01-02 RX ORDER — BUPROPION HYDROCHLORIDE 150 MG/1
TABLET ORAL
COMMUNITY
Start: 2024-12-12

## 2025-01-02 RX ORDER — NICOTINE 21 MG/24HR
PATCH, TRANSDERMAL 24 HOURS TRANSDERMAL
COMMUNITY
Start: 2024-12-12

## 2025-01-02 RX ORDER — SODIUM CHLORIDE 9 MG/ML
20 INJECTION, SOLUTION INTRAVENOUS ONCE
Status: COMPLETED | OUTPATIENT
Start: 2025-01-02 | End: 2025-01-02

## 2025-01-02 RX ORDER — HYDROCORTISONE SODIUM SUCCINATE 100 MG/2ML
100 INJECTION INTRAMUSCULAR; INTRAVENOUS AS NEEDED
Status: CANCELLED | OUTPATIENT
Start: 2025-01-02

## 2025-01-02 RX ORDER — DIPHENHYDRAMINE HYDROCHLORIDE 50 MG/ML
50 INJECTION INTRAMUSCULAR; INTRAVENOUS AS NEEDED
Status: CANCELLED | OUTPATIENT
Start: 2025-01-02

## 2025-01-02 RX ORDER — SODIUM CHLORIDE 9 MG/ML
20 INJECTION, SOLUTION INTRAVENOUS ONCE
Status: CANCELLED | OUTPATIENT
Start: 2025-01-02

## 2025-01-02 RX ORDER — PALONOSETRON 0.05 MG/ML
0.25 INJECTION, SOLUTION INTRAVENOUS ONCE
Status: COMPLETED | OUTPATIENT
Start: 2025-01-02 | End: 2025-01-02

## 2025-01-02 RX ORDER — FAMOTIDINE 10 MG/ML
20 INJECTION, SOLUTION INTRAVENOUS AS NEEDED
Status: CANCELLED | OUTPATIENT
Start: 2025-01-02

## 2025-01-02 RX ADMIN — DOCETAXEL 150 MG: 20 INJECTION, SOLUTION, CONCENTRATE INTRAVENOUS at 12:23

## 2025-01-02 RX ADMIN — SODIUM CHLORIDE 20 ML/HR: 9 INJECTION, SOLUTION INTRAVENOUS at 13:44

## 2025-01-02 RX ADMIN — CYCLOPHOSPHAMIDE 1210 MG: 2 INJECTION INTRAVENOUS at 13:45

## 2025-01-02 RX ADMIN — PALONOSETRON HYDROCHLORIDE 0.25 MG: 0.25 INJECTION INTRAVENOUS at 11:52

## 2025-01-03 ENCOUNTER — HOSPITAL ENCOUNTER (OUTPATIENT)
Dept: ONCOLOGY | Facility: HOSPITAL | Age: 35
Discharge: HOME | End: 2025-01-03
Payer: MEDICAID

## 2025-01-03 VITALS
TEMPERATURE: 97 F | DIASTOLIC BLOOD PRESSURE: 59 MMHG | BODY MASS INDEX: 31.55 KG/M2 | WEIGHT: 201 LBS | RESPIRATION RATE: 16 BRPM | SYSTOLIC BLOOD PRESSURE: 124 MMHG | HEIGHT: 67 IN | HEART RATE: 84 BPM

## 2025-01-03 DIAGNOSIS — C50.212 CARCINOMA OF UPPER-INNER QUADRANT OF FEMALE BREAST, LEFT: ICD-10-CM

## 2025-01-03 DIAGNOSIS — C50.212 CARCINOMA OF UPPER-INNER QUADRANT OF FEMALE BREAST, LEFT: Primary | ICD-10-CM

## 2025-01-03 PROCEDURE — 25010000002 PEGFILGRASTIM-PBBK 6 MG/0.6ML SOLUTION PREFILLED SYRINGE: Performed by: INTERNAL MEDICINE

## 2025-01-03 PROCEDURE — 96372 THER/PROPH/DIAG INJ SC/IM: CPT

## 2025-01-03 RX ORDER — ONDANSETRON 8 MG/1
8 TABLET, FILM COATED ORAL 3 TIMES DAILY PRN
Qty: 30 TABLET | Refills: 5 | Status: SHIPPED | OUTPATIENT
Start: 2025-01-03

## 2025-01-03 RX ORDER — PEGFILGRASTIM 6 MG/.6ML
6 INJECTION SUBCUTANEOUS ONCE
Start: 2025-01-03 | End: 2025-01-03

## 2025-01-03 RX ADMIN — PEGFILGRASTIM 6 MG: 6 INJECTION SUBCUTANEOUS at 15:14

## 2025-01-09 ENCOUNTER — TELEPHONE (OUTPATIENT)
Dept: RADIATION ONCOLOGY | Facility: HOSPITAL | Age: 35
End: 2025-01-09
Payer: MEDICAID

## 2025-01-09 ENCOUNTER — DOCUMENTATION (OUTPATIENT)
Dept: OTHER | Facility: HOSPITAL | Age: 35
End: 2025-01-09
Payer: MEDICAID

## 2025-01-09 NOTE — SIGNIFICANT NOTE
SW received email from pt requesting assistance with [employer] bill, as well as needing [facility] for night of 1/23 as she has apt with radiation medicine on 1/24. SW explained we will need w-9 from mortgage company, and spoke with pt on this date to follow up and she cannot get ahold of them to sign it. SW and pt agreed to submit for assistance with [employer]. SW will send pt [employer] application to e-sign and email back.   SW then confirmed [facility] referral was sent on this date, and they will be reaching out to pt to confirm stay. Pt thanked MICHAELA and had no other needs at this time.       01/09/25 1100   Oncology Interventions   Practical Needs Utilities;Lodging Assistance  ([employer] [address] assistance with [employer], [facility] for night of 1/23)

## 2025-01-09 NOTE — TELEPHONE ENCOUNTER
As Chemo is due to be completed on 2/13/25, called patient to re-schedule Re-Evaluation with Dr. Sherwood for Tuesday, 2/18/25 at 10:30 am at [facility].  This will be a location change from original appointment.  Patient verbalized an understanding of date, time, and location of appointment.  Provided contact number should patient have questions or concerns.

## 2025-01-16 ENCOUNTER — TELEPHONE (OUTPATIENT)
Dept: ONCOLOGY | Facility: CLINIC | Age: 35
End: 2025-01-16
Payer: MEDICAID

## 2025-01-16 NOTE — TELEPHONE ENCOUNTER
I called patient back and she is very congested and said that the fever just started this morning.  She is coughing and congested and has just now taken some tylenol.  I advised that she needs to get tested for flu and covid and she is going to urgent care at 1pm today.  I asked her to call us back and let us know if the testing is positive and she said that she would.

## 2025-01-17 DIAGNOSIS — C50.212 CARCINOMA OF UPPER-INNER QUADRANT OF FEMALE BREAST, LEFT: Primary | ICD-10-CM

## 2025-01-23 ENCOUNTER — DOCUMENTATION (OUTPATIENT)
Dept: OTHER | Facility: HOSPITAL | Age: 35
End: 2025-01-23
Payer: MEDICAID

## 2025-01-23 ENCOUNTER — OFFICE VISIT (OUTPATIENT)
Dept: ONCOLOGY | Facility: CLINIC | Age: 35
End: 2025-01-23
Payer: MEDICAID

## 2025-01-23 ENCOUNTER — HOSPITAL ENCOUNTER (OUTPATIENT)
Dept: ONCOLOGY | Facility: HOSPITAL | Age: 35
Discharge: HOME | End: 2025-01-23
Payer: MEDICAID

## 2025-01-23 VITALS
BODY MASS INDEX: 31.55 KG/M2 | HEART RATE: 72 BPM | RESPIRATION RATE: 20 BRPM | SYSTOLIC BLOOD PRESSURE: 110 MMHG | TEMPERATURE: 97.8 F | HEIGHT: 67 IN | OXYGEN SATURATION: 99 % | DIASTOLIC BLOOD PRESSURE: 65 MMHG | WEIGHT: 201 LBS

## 2025-01-23 DIAGNOSIS — C50.212 CARCINOMA OF UPPER-INNER QUADRANT OF FEMALE BREAST, LEFT: ICD-10-CM

## 2025-01-23 DIAGNOSIS — Z51.11 ENCOUNTER FOR ANTINEOPLASTIC CHEMOTHERAPY: ICD-10-CM

## 2025-01-23 DIAGNOSIS — C50.212 CARCINOMA OF UPPER-INNER QUADRANT OF FEMALE BREAST, LEFT: Primary | ICD-10-CM

## 2025-01-23 LAB
ALBUMIN SERPL-MCNC: 4.6 G/DL (ref 3.5–5.2)
ALBUMIN/GLOB SERPL: 1.6 G/DL
ALP SERPL-CCNC: 93 U/L (ref 39–117)
ALT SERPL W P-5'-P-CCNC: 27 U/L (ref 1–33)
ANION GAP SERPL CALCULATED.3IONS-SCNC: 13 MMOL/L (ref 5–15)
AST SERPL-CCNC: 17 U/L (ref 1–32)
BASOPHILS # BLD AUTO: 0.01 10*3/MM3 (ref 0–0.2)
BASOPHILS NFR BLD AUTO: 0.1 % (ref 0–1.5)
BILIRUB SERPL-MCNC: 0.2 MG/DL (ref 0–1.2)
BUN SERPL-MCNC: 10 MG/DL (ref 6–20)
BUN/CREAT SERPL: 11.8 (ref 7–25)
CALCIUM SPEC-SCNC: 10.1 MG/DL (ref 8.6–10.5)
CHLORIDE SERPL-SCNC: 104 MMOL/L (ref 98–107)
CO2 SERPL-SCNC: 22 MMOL/L (ref 22–29)
CREAT SERPL-MCNC: 0.85 MG/DL (ref 0.57–1)
DEPRECATED RDW RBC AUTO: 42.8 FL (ref 37–54)
EGFRCR SERPLBLD CKD-EPI 2021: 91.2 ML/MIN/1.73
EOSINOPHIL # BLD AUTO: 0 10*3/MM3 (ref 0–0.4)
EOSINOPHIL NFR BLD AUTO: 0 % (ref 0.3–6.2)
ERYTHROCYTE [DISTWIDTH] IN BLOOD BY AUTOMATED COUNT: 13.1 % (ref 12.3–15.4)
GLOBULIN UR ELPH-MCNC: 2.9 GM/DL
GLUCOSE SERPL-MCNC: 140 MG/DL (ref 65–99)
HCT VFR BLD AUTO: 34.8 % (ref 34–46.6)
HGB BLD-MCNC: 11.6 G/DL (ref 12–15.9)
IMM GRANULOCYTES # BLD AUTO: 0.04 10*3/MM3 (ref 0–0.05)
IMM GRANULOCYTES NFR BLD AUTO: 0.3 % (ref 0–0.5)
LYMPHOCYTES # BLD AUTO: 0.8 10*3/MM3 (ref 0.7–3.1)
LYMPHOCYTES NFR BLD AUTO: 6 % (ref 19.6–45.3)
MCH RBC QN AUTO: 29.6 PG (ref 26.6–33)
MCHC RBC AUTO-ENTMCNC: 33.3 G/DL (ref 31.5–35.7)
MCV RBC AUTO: 88.8 FL (ref 79–97)
MONOCYTES # BLD AUTO: 0.46 10*3/MM3 (ref 0.1–0.9)
MONOCYTES NFR BLD AUTO: 3.4 % (ref 5–12)
NEUTROPHILS NFR BLD AUTO: 12.07 10*3/MM3 (ref 1.7–7)
NEUTROPHILS NFR BLD AUTO: 90.2 % (ref 42.7–76)
PLATELET # BLD AUTO: 366 10*3/MM3 (ref 140–450)
PMV BLD AUTO: 8.6 FL (ref 6–12)
POTASSIUM SERPL-SCNC: 4.4 MMOL/L (ref 3.5–5.2)
PROT SERPL-MCNC: 7.5 G/DL (ref 6–8.5)
RBC # BLD AUTO: 3.92 10*6/MM3 (ref 3.77–5.28)
SODIUM SERPL-SCNC: 139 MMOL/L (ref 136–145)
WBC NRBC COR # BLD AUTO: 13.38 10*3/MM3 (ref 3.4–10.8)

## 2025-01-23 PROCEDURE — 1160F RVW MEDS BY RX/DR IN RCRD: CPT | Performed by: NURSE PRACTITIONER

## 2025-01-23 PROCEDURE — 25010000002 PALONOSETRON PER 25 MCG: Performed by: NURSE PRACTITIONER

## 2025-01-23 PROCEDURE — 85025 COMPLETE CBC W/AUTO DIFF WBC: CPT | Performed by: INTERNAL MEDICINE

## 2025-01-23 PROCEDURE — 96417 CHEMO IV INFUS EACH ADDL SEQ: CPT

## 2025-01-23 PROCEDURE — 96413 CHEMO IV INFUSION 1 HR: CPT

## 2025-01-23 PROCEDURE — 80053 COMPREHEN METABOLIC PANEL: CPT | Performed by: INTERNAL MEDICINE

## 2025-01-23 PROCEDURE — 1159F MED LIST DOCD IN RCRD: CPT | Performed by: NURSE PRACTITIONER

## 2025-01-23 PROCEDURE — 25010000002 CYCLOPHOSPHAMIDE 2 GM/10ML SOLUTION 10 ML VIAL: Performed by: NURSE PRACTITIONER

## 2025-01-23 PROCEDURE — 25010000002 DOCETAXEL 20 MG/ML SOLUTION 8 ML VIAL: Performed by: NURSE PRACTITIONER

## 2025-01-23 PROCEDURE — 1126F AMNT PAIN NOTED NONE PRSNT: CPT | Performed by: NURSE PRACTITIONER

## 2025-01-23 PROCEDURE — 99214 OFFICE O/P EST MOD 30 MIN: CPT | Performed by: NURSE PRACTITIONER

## 2025-01-23 PROCEDURE — 96375 TX/PRO/DX INJ NEW DRUG ADDON: CPT

## 2025-01-23 PROCEDURE — 25810000003 SODIUM CHLORIDE 0.9 % SOLUTION 250 ML FLEX CONT: Performed by: NURSE PRACTITIONER

## 2025-01-23 RX ORDER — PALONOSETRON 0.05 MG/ML
0.25 INJECTION, SOLUTION INTRAVENOUS ONCE
Status: COMPLETED | OUTPATIENT
Start: 2025-01-23 | End: 2025-01-23

## 2025-01-23 RX ORDER — HYDROCORTISONE SODIUM SUCCINATE 100 MG/2ML
100 INJECTION INTRAMUSCULAR; INTRAVENOUS AS NEEDED
Status: DISCONTINUED | OUTPATIENT
Start: 2025-01-23 | End: 2025-01-24 | Stop reason: HOSPADM

## 2025-01-23 RX ORDER — DIPHENHYDRAMINE HYDROCHLORIDE 50 MG/ML
50 INJECTION INTRAMUSCULAR; INTRAVENOUS AS NEEDED
Status: DISCONTINUED | OUTPATIENT
Start: 2025-01-23 | End: 2025-01-24 | Stop reason: HOSPADM

## 2025-01-23 RX ORDER — SODIUM CHLORIDE 9 MG/ML
20 INJECTION, SOLUTION INTRAVENOUS ONCE
Status: DISCONTINUED | OUTPATIENT
Start: 2025-01-23 | End: 2025-01-24 | Stop reason: HOSPADM

## 2025-01-23 RX ORDER — HYDROCORTISONE SODIUM SUCCINATE 100 MG/2ML
100 INJECTION INTRAMUSCULAR; INTRAVENOUS AS NEEDED
Status: CANCELLED | OUTPATIENT
Start: 2025-01-23

## 2025-01-23 RX ORDER — PALONOSETRON 0.05 MG/ML
0.25 INJECTION, SOLUTION INTRAVENOUS ONCE
Status: CANCELLED | OUTPATIENT
Start: 2025-01-23

## 2025-01-23 RX ORDER — DIPHENHYDRAMINE HYDROCHLORIDE 50 MG/ML
50 INJECTION INTRAMUSCULAR; INTRAVENOUS AS NEEDED
Status: CANCELLED | OUTPATIENT
Start: 2025-01-23

## 2025-01-23 RX ORDER — FAMOTIDINE 10 MG/ML
20 INJECTION, SOLUTION INTRAVENOUS AS NEEDED
Status: DISCONTINUED | OUTPATIENT
Start: 2025-01-23 | End: 2025-01-24 | Stop reason: HOSPADM

## 2025-01-23 RX ORDER — SODIUM CHLORIDE 9 MG/ML
20 INJECTION, SOLUTION INTRAVENOUS ONCE
Status: CANCELLED | OUTPATIENT
Start: 2025-01-23

## 2025-01-23 RX ORDER — LORATADINE 10 MG/1
10 TABLET ORAL DAILY
Qty: 30 TABLET | Refills: 1 | Status: SHIPPED | OUTPATIENT
Start: 2025-01-23

## 2025-01-23 RX ORDER — FAMOTIDINE 10 MG/ML
20 INJECTION, SOLUTION INTRAVENOUS AS NEEDED
Status: CANCELLED | OUTPATIENT
Start: 2025-01-23

## 2025-01-23 RX ADMIN — PALONOSETRON HYDROCHLORIDE 0.25 MG: 0.25 INJECTION INTRAVENOUS at 12:01

## 2025-01-23 RX ADMIN — DOCETAXEL 150 MG: 20 INJECTION, SOLUTION, CONCENTRATE INTRAVENOUS at 12:21

## 2025-01-23 RX ADMIN — SODIUM CHLORIDE 1210 MG: 9 INJECTION, SOLUTION INTRAVENOUS at 13:37

## 2025-01-23 NOTE — PROGRESS NOTES
MICHAELA met with pt and her supportive  Peng in infusion to provide assistance with transportation costs. MICHAELA provided pt with a $45 gas voucher for [employer] to be used on 1/26/25. Pt thanked MICHAELA for the assistance and inquired about a $400 rosmery that MICHAELA Desai applied for on her behalf. MICHAELA Butterfield informed her it was likely the [employer] rosmery but assured her she would check on the status with MICHAELA Desai. MICHAELA Desai has applied to [employer] on pt's behalf. MICHAELA Desai informed MICHAELA Butterfield that pt is referring to [employer] and that they sent her payment on 1/17/25. SWs talked to pt and discussed that [employer] sent her a $500 check and [employer] sent her a $400 check so there may still be lingering mail delayed due to recent inclement weather. Pt voiced understanding and said she would keep checking her mail. SWs will continue to be available for ongoing support and assistance.      Interventions:  Transportation Needs: gas cards (Provided pt with $45.00 gas voucher for "[employer] to be redeemed on 1/26/25.)

## 2025-01-23 NOTE — PROGRESS NOTES
"      PROBLEM LIST:  [id number] ER+ (70%, 3+) HI+ (60%, 3+), Her2 negative (0+) invasive ductal carcinoma of the left breast  CT chest abdomen and pelvis and bone scan done at outside hospital showed no evidence of metastatic disease.  Bilateral mastectomy on 11/12/24.  Pathology showed a 3.5 cm high grade tumor.  1/2 SLN involved.  2 additional axillary LN negative for metastatic carcinoma.  Adjuvant chemotherapy with taxotere and cytoxan started 12/11/24.  Rheumatoid arthritis  anxiety    Subjective     CHIEF COMPLAINT: breast cancer    HISTORY OF PRESENT ILLNESS:   Eliane Argueta returns for follow-up.   She has completed 2 cycles of chemotherapy.  Tolerating it fairly well.  She has nausea and constipation for about a week that is managed with her as needed medication.  She does have quite a bit of bone pain with Neulasta that is significant for about a day.  She is taking Tylenol with some improvement.  She is still not sleeping very well despite trying to increase her Valium stating her sleep schedule is just off.  She was diagnosed with flu last week and was given Tamiflu which she has finished.  She has been afebrile for 5 to 6 days.  She feels back to baseline and is ready for treatment today.      Objective      /65 Comment: LUE  Pulse 72   Temp 97.8 °F (36.6 °C) (Temporal)   Resp 20   Ht 170.2 cm (67\")   Wt 91.2 kg (201 lb)   SpO2 99% Comment: RA  BMI 31.48 kg/m²      Performance Status:  ECOG score: 1     General: well appearing female in no acute distress  Neuro: alert and oriented  HEENT: sclera anicteric, oropharynx clear  Lymphatics: no cervical, supraclavicular, or axillary adenopathy  Cardiovascular: regular rate and rhythm, no murmurs  Lungs: clear to auscultation bilaterally  Abdomen: soft, nontender, nondistended.  No palpable organomegaly  Extremeties: no lower extremity edema  Skin: no rashes, lesions, bruising, or petechiae  Psych: mood and affect appropriate    I have " reexamined the patient and the results are consistent with the previously documented exam. PATRICK Villegas    RECENT LABS:  Lab Results   Component Value Date    WBC 13.38 (H) 01/23/2025    HGB 11.6 (L) 01/23/2025    HCT 34.8 01/23/2025    MCV 88.8 01/23/2025     01/23/2025       Lab Results   Component Value Date    GLUCOSE 140 (H) 01/23/2025    BUN 10 01/23/2025    CREATININE 0.85 01/23/2025    BCR 11.8 01/23/2025    K 4.4 01/23/2025    CO2 22.0 01/23/2025    CALCIUM 10.1 01/23/2025    ALBUMIN 4.6 01/23/2025    AST 17 01/23/2025    ALT 27 01/23/2025           ASSESSMENT AND PLAN:     Eliane Argueta is a 35 y.o. female with a T2 N1 M0 ER positive HER2 negative invasive ductal carcinoma of the left breast.     She received pegylated G-CSF with last cycle.  She still had quite a bit of bone pain that she managed with Tylenol.  May consider tramadol if needed.       Insomnia: Still having issues despite trying increased Valium at 10 mg.  She is going to try to work on her sleep routine/schedule.     Depression: Continue Wellbutrin per her PCP.    I reviewed her labs from today that are adequate for treatment.  We will proceed with cycle 3 today as planned.    After completion of chemotherapy, she is a candidate for radiation therapy, followed by endocrine therapy with ovarian suppression along with anastrozole, and the addition of Kisqali for 3 years.  She has appointment with Dr. Sherwood on 2/18.    Follow-up in 3 weeks for cycle 4.      Crystal Sosa APRN  [facility] Hematology and Oncology    1/23/2025

## 2025-01-24 ENCOUNTER — HOSPITAL ENCOUNTER (OUTPATIENT)
Dept: ONCOLOGY | Facility: HOSPITAL | Age: 35
Discharge: HOME | End: 2025-01-24
Payer: MEDICAID

## 2025-01-24 VITALS
TEMPERATURE: 97.2 F | SYSTOLIC BLOOD PRESSURE: 131 MMHG | DIASTOLIC BLOOD PRESSURE: 62 MMHG | WEIGHT: 202 LBS | BODY MASS INDEX: 31.71 KG/M2 | HEART RATE: 87 BPM | HEIGHT: 67 IN | RESPIRATION RATE: 16 BRPM

## 2025-01-24 DIAGNOSIS — C50.212 CARCINOMA OF UPPER-INNER QUADRANT OF FEMALE BREAST, LEFT: Primary | ICD-10-CM

## 2025-01-24 PROCEDURE — 25010000002 PEGFILGRASTIM-PBBK 6 MG/0.6ML SOLUTION PREFILLED SYRINGE: Performed by: NURSE PRACTITIONER

## 2025-01-24 PROCEDURE — 96372 THER/PROPH/DIAG INJ SC/IM: CPT

## 2025-01-24 PROCEDURE — 63710000001 ONDANSETRON ODT 4 MG TABLET DISPERSIBLE: Performed by: INTERNAL MEDICINE

## 2025-01-24 RX ORDER — ONDANSETRON 4 MG/1
8 TABLET, ORALLY DISINTEGRATING ORAL ONCE
Status: COMPLETED | OUTPATIENT
Start: 2025-01-24 | End: 2025-01-24

## 2025-01-24 RX ORDER — PEGFILGRASTIM 6 MG/.6ML
6 INJECTION SUBCUTANEOUS ONCE
Start: 2025-01-24 | End: 2025-01-24

## 2025-01-24 RX ADMIN — PEGFILGRASTIM 6 MG: 6 INJECTION SUBCUTANEOUS at 15:53

## 2025-01-24 RX ADMIN — ONDANSETRON 8 MG: 4 TABLET, ORALLY DISINTEGRATING ORAL at 15:56

## 2025-02-06 ENCOUNTER — DOCUMENTATION (OUTPATIENT)
Dept: OTHER | Facility: HOSPITAL | Age: 35
End: 2025-02-06
Payer: MEDICAID

## 2025-02-06 NOTE — PROGRESS NOTES
SW received email from pt with JANET Davies needing assistance with. SW contacted pt via phone on this date, to explain that we have utilized all resources ([employer], [employer] and [email or web address]). SW educated on [employer] and offered to send application, but explained they have 90 days to review and approve or deny application. SW further explained we dont have assistance funds that help with bills ongoing while in treatment. Pt verbalized understanding. SW encouraged pt to reach out ongoing, to which she was agreeable.      HDL 51 05/17/2023 08:07 AM    TRIG 168 05/17/2023 08:07 AM     Hemoglobin A1C:   Lab Results   Component Value Date/Time    LABA1C 6.2 10/06/2021 07:29 AM     TSH: No results found for: \"TSH\"  Troponin:   Lab Results   Component Value Date/Time    TROPONINT 0.539 04/30/2023 11:56 AM    TROPONINT 0.016 04/29/2023 06:15 PM    TROPONINT <0.010 04/16/2023 08:52 AM     Lactic Acid: No results for input(s): \"LACTA\" in the last 72 hours.  BNP: No results for input(s): \"PROBNP\" in the last 72 hours.  UA:  Lab Results   Component Value Date/Time    NITRU POSITIVE 01/12/2024 05:46 PM    NITRU NEGATIVE 09/16/2012 03:45 AM    COLORU ORANGE 01/12/2024 05:46 PM    WBCUA 9 01/12/2024 05:46 PM    RBCUA 363 01/12/2024 05:46 PM    MUCUS RARE 01/12/2024 05:46 PM    TRICHOMONAS NONE SEEN 01/09/2024 10:52 PM    BACTERIA NEGATIVE 01/12/2024 05:46 PM    CLARITYU CLEAR 01/12/2024 05:46 PM    SPECGRAV 1.015 01/12/2024 05:46 PM    LEUKOCYTESUR MODERATE NUMBER OR AMOUNT OBSERVED 01/12/2024 05:46 PM    UROBILINOGEN 4.0 01/12/2024 05:46 PM    BILIRUBINUR MODERATE NUMBER OR AMOUNT OBSERVED 01/12/2024 05:46 PM    BLOODU LARGE NUMBER OR AMOUNT OBSERVED 01/12/2024 05:46 PM    GLUCOSEU negative 09/16/2012 03:57 AM    KETUA TRACE 01/12/2024 05:46 PM     Urine Cultures: No results found for: \"LABURIN\"  Blood Cultures: No results found for: \"BC\"  No results found for: \"BLOODCULT2\"  Organism: No results found for: \"ORG\"    Time Spent Discharging patient 35 minutes    Electronically signed by Tobi Carroll MD on 1/17/2024 at 2:23 PM

## 2025-02-13 ENCOUNTER — HOSPITAL ENCOUNTER (OUTPATIENT)
Dept: ONCOLOGY | Facility: HOSPITAL | Age: 35
Discharge: HOME | End: 2025-02-13
Admitting: NURSE PRACTITIONER
Payer: MEDICAID

## 2025-02-13 ENCOUNTER — OFFICE VISIT (OUTPATIENT)
Dept: ONCOLOGY | Facility: CLINIC | Age: 35
End: 2025-02-13
Payer: MEDICAID

## 2025-02-13 ENCOUNTER — APPOINTMENT (OUTPATIENT)
Dept: ONCOLOGY | Facility: HOSPITAL | Age: 35
End: 2025-02-13
Payer: MEDICAID

## 2025-02-13 VITALS
DIASTOLIC BLOOD PRESSURE: 86 MMHG | HEIGHT: 67 IN | RESPIRATION RATE: 16 BRPM | BODY MASS INDEX: 32.22 KG/M2 | HEART RATE: 77 BPM | OXYGEN SATURATION: 98 % | SYSTOLIC BLOOD PRESSURE: 134 MMHG | TEMPERATURE: 97.5 F | WEIGHT: 205.3 LBS

## 2025-02-13 DIAGNOSIS — C50.212 CARCINOMA OF UPPER-INNER QUADRANT OF FEMALE BREAST, LEFT: Primary | ICD-10-CM

## 2025-02-13 DIAGNOSIS — Z17.0 MALIGNANT NEOPLASM OF LEFT BREAST IN FEMALE, ESTROGEN RECEPTOR POSITIVE, UNSPECIFIED SITE OF BREAST: Primary | ICD-10-CM

## 2025-02-13 DIAGNOSIS — C50.912 MALIGNANT NEOPLASM OF LEFT BREAST IN FEMALE, ESTROGEN RECEPTOR POSITIVE, UNSPECIFIED SITE OF BREAST: Primary | ICD-10-CM

## 2025-02-13 LAB
ALBUMIN SERPL-MCNC: 4.6 G/DL (ref 3.5–5.2)
ALBUMIN/GLOB SERPL: 1.8 G/DL
ALP SERPL-CCNC: 90 U/L (ref 39–117)
ALT SERPL W P-5'-P-CCNC: 69 U/L (ref 1–33)
ANION GAP SERPL CALCULATED.3IONS-SCNC: 13 MMOL/L (ref 5–15)
AST SERPL-CCNC: 42 U/L (ref 1–32)
BASOPHILS # BLD AUTO: 0.02 10*3/MM3 (ref 0–0.2)
BASOPHILS NFR BLD AUTO: 0.1 % (ref 0–1.5)
BILIRUB SERPL-MCNC: 0.3 MG/DL (ref 0–1.2)
BUN SERPL-MCNC: 10 MG/DL (ref 6–20)
BUN/CREAT SERPL: 12.7 (ref 7–25)
CALCIUM SPEC-SCNC: 10.1 MG/DL (ref 8.6–10.5)
CHLORIDE SERPL-SCNC: 105 MMOL/L (ref 98–107)
CO2 SERPL-SCNC: 22 MMOL/L (ref 22–29)
CREAT SERPL-MCNC: 0.79 MG/DL (ref 0.57–1)
DEPRECATED RDW RBC AUTO: 47.7 FL (ref 37–54)
EGFRCR SERPLBLD CKD-EPI 2021: 99.6 ML/MIN/1.73
EOSINOPHIL # BLD AUTO: 0.01 10*3/MM3 (ref 0–0.4)
EOSINOPHIL NFR BLD AUTO: 0.1 % (ref 0.3–6.2)
ERYTHROCYTE [DISTWIDTH] IN BLOOD BY AUTOMATED COUNT: 14.7 % (ref 12.3–15.4)
GLOBULIN UR ELPH-MCNC: 2.6 GM/DL
GLUCOSE SERPL-MCNC: 155 MG/DL (ref 65–99)
HCT VFR BLD AUTO: 32.9 % (ref 34–46.6)
HGB BLD-MCNC: 11.1 G/DL (ref 12–15.9)
IMM GRANULOCYTES # BLD AUTO: 0.04 10*3/MM3 (ref 0–0.05)
IMM GRANULOCYTES NFR BLD AUTO: 0.3 % (ref 0–0.5)
LYMPHOCYTES # BLD AUTO: 0.88 10*3/MM3 (ref 0.7–3.1)
LYMPHOCYTES NFR BLD AUTO: 6.5 % (ref 19.6–45.3)
MCH RBC QN AUTO: 30 PG (ref 26.6–33)
MCHC RBC AUTO-ENTMCNC: 33.7 G/DL (ref 31.5–35.7)
MCV RBC AUTO: 88.9 FL (ref 79–97)
MONOCYTES # BLD AUTO: 0.61 10*3/MM3 (ref 0.1–0.9)
MONOCYTES NFR BLD AUTO: 4.5 % (ref 5–12)
NEUTROPHILS NFR BLD AUTO: 11.88 10*3/MM3 (ref 1.7–7)
NEUTROPHILS NFR BLD AUTO: 88.5 % (ref 42.7–76)
PLATELET # BLD AUTO: 302 10*3/MM3 (ref 140–450)
PMV BLD AUTO: 8.6 FL (ref 6–12)
POTASSIUM SERPL-SCNC: 4.5 MMOL/L (ref 3.5–5.2)
PROT SERPL-MCNC: 7.2 G/DL (ref 6–8.5)
RBC # BLD AUTO: 3.7 10*6/MM3 (ref 3.77–5.28)
SODIUM SERPL-SCNC: 140 MMOL/L (ref 136–145)
WBC NRBC COR # BLD AUTO: 13.44 10*3/MM3 (ref 3.4–10.8)

## 2025-02-13 PROCEDURE — 96417 CHEMO IV INFUS EACH ADDL SEQ: CPT

## 2025-02-13 PROCEDURE — 25010000002 PALONOSETRON PER 25 MCG: Performed by: INTERNAL MEDICINE

## 2025-02-13 PROCEDURE — 96375 TX/PRO/DX INJ NEW DRUG ADDON: CPT

## 2025-02-13 PROCEDURE — 99214 OFFICE O/P EST MOD 30 MIN: CPT | Performed by: INTERNAL MEDICINE

## 2025-02-13 PROCEDURE — 25010000002 DOCETAXEL 20 MG/ML SOLUTION 8 ML VIAL: Performed by: INTERNAL MEDICINE

## 2025-02-13 PROCEDURE — 96413 CHEMO IV INFUSION 1 HR: CPT

## 2025-02-13 PROCEDURE — 1126F AMNT PAIN NOTED NONE PRSNT: CPT | Performed by: INTERNAL MEDICINE

## 2025-02-13 PROCEDURE — 80053 COMPREHEN METABOLIC PANEL: CPT | Performed by: NURSE PRACTITIONER

## 2025-02-13 PROCEDURE — 85025 COMPLETE CBC W/AUTO DIFF WBC: CPT | Performed by: NURSE PRACTITIONER

## 2025-02-13 PROCEDURE — 25010000002 CYCLOPHOSPHAMIDE 2 GM/10ML SOLUTION 10 ML VIAL: Performed by: INTERNAL MEDICINE

## 2025-02-13 PROCEDURE — 25810000003 SODIUM CHLORIDE 0.9 % SOLUTION: Performed by: INTERNAL MEDICINE

## 2025-02-13 PROCEDURE — 25810000003 SODIUM CHLORIDE 0.9 % SOLUTION 250 ML FLEX CONT: Performed by: INTERNAL MEDICINE

## 2025-02-13 RX ORDER — PALONOSETRON 0.05 MG/ML
0.25 INJECTION, SOLUTION INTRAVENOUS ONCE
Status: COMPLETED | OUTPATIENT
Start: 2025-02-13 | End: 2025-02-13

## 2025-02-13 RX ORDER — FAMOTIDINE 10 MG/ML
20 INJECTION, SOLUTION INTRAVENOUS AS NEEDED
Status: DISCONTINUED | OUTPATIENT
Start: 2025-02-13 | End: 2025-02-14 | Stop reason: HOSPADM

## 2025-02-13 RX ORDER — PALONOSETRON 0.05 MG/ML
0.25 INJECTION, SOLUTION INTRAVENOUS ONCE
Status: CANCELLED | OUTPATIENT
Start: 2025-02-13

## 2025-02-13 RX ORDER — FAMOTIDINE 10 MG/ML
20 INJECTION, SOLUTION INTRAVENOUS AS NEEDED
Status: CANCELLED | OUTPATIENT
Start: 2025-02-13

## 2025-02-13 RX ORDER — DIPHENHYDRAMINE HYDROCHLORIDE 50 MG/ML
50 INJECTION INTRAMUSCULAR; INTRAVENOUS AS NEEDED
Status: DISCONTINUED | OUTPATIENT
Start: 2025-02-13 | End: 2025-02-14 | Stop reason: HOSPADM

## 2025-02-13 RX ORDER — DIPHENHYDRAMINE HYDROCHLORIDE 50 MG/ML
50 INJECTION INTRAMUSCULAR; INTRAVENOUS AS NEEDED
Status: CANCELLED | OUTPATIENT
Start: 2025-02-13

## 2025-02-13 RX ORDER — SODIUM CHLORIDE 9 MG/ML
20 INJECTION, SOLUTION INTRAVENOUS ONCE
Status: COMPLETED | OUTPATIENT
Start: 2025-02-13 | End: 2025-02-13

## 2025-02-13 RX ORDER — HYDROCORTISONE SODIUM SUCCINATE 100 MG/2ML
100 INJECTION INTRAMUSCULAR; INTRAVENOUS AS NEEDED
Status: DISCONTINUED | OUTPATIENT
Start: 2025-02-13 | End: 2025-02-14 | Stop reason: HOSPADM

## 2025-02-13 RX ORDER — SODIUM CHLORIDE 9 MG/ML
20 INJECTION, SOLUTION INTRAVENOUS ONCE
Status: CANCELLED | OUTPATIENT
Start: 2025-02-13

## 2025-02-13 RX ORDER — FEZOLINETANT 45 MG/1
45 TABLET, FILM COATED ORAL EVERY 24 HOURS
Qty: 30 TABLET | Refills: 5 | Status: SHIPPED | OUTPATIENT
Start: 2025-02-13

## 2025-02-13 RX ORDER — HYDROCORTISONE SODIUM SUCCINATE 100 MG/2ML
100 INJECTION INTRAMUSCULAR; INTRAVENOUS AS NEEDED
Status: CANCELLED | OUTPATIENT
Start: 2025-02-13

## 2025-02-13 RX ADMIN — SODIUM CHLORIDE 1210 MG: 9 INJECTION, SOLUTION INTRAVENOUS at 12:45

## 2025-02-13 RX ADMIN — SODIUM CHLORIDE 120 MG: 9 INJECTION, SOLUTION INTRAVENOUS at 11:27

## 2025-02-13 RX ADMIN — SODIUM CHLORIDE 20 ML/HR: 9 INJECTION, SOLUTION INTRAVENOUS at 10:48

## 2025-02-13 RX ADMIN — PALONOSETRON HYDROCHLORIDE 0.25 MG: 0.25 INJECTION INTRAVENOUS at 10:48

## 2025-02-13 NOTE — PROGRESS NOTES
"      PROBLEM LIST:  [id number] ER+ (70%, 3+) CA+ (60%, 3+), Her2 negative (0+) invasive ductal carcinoma of the left breast  CT chest abdomen and pelvis and bone scan done at outside hospital showed no evidence of metastatic disease.  Bilateral mastectomy on 11/12/24.  Pathology showed a 3.5 cm high grade tumor.  1/2 SLN involved.  2 additional axillary LN negative for metastatic carcinoma.  Adjuvant chemotherapy with taxotere and cytoxan started 12/11/24.  Rheumatoid arthritis  anxiety    Subjective     CHIEF COMPLAINT: breast cancer    HISTORY OF PRESENT ILLNESS:   Eliane Argueta returns for follow-up.   She has completed 3 cycles of chemotherapy.   She is doing okay.  This last cycle was pretty similar to the previous.  She says she has started to have some pretty significant hot flashes.  They wake her up in the middle of the night.  She has not had a menstrual bleed since January.    Objective      /86   Pulse 77   Temp 97.5 °F (36.4 °C) (Temporal)   Resp 16   Ht 170.2 cm (67.01\")   Wt 93.1 kg (205 lb 4.8 oz)   SpO2 98%   BMI 32.15 kg/m²      Performance Status:  ECOG score: 0     General: well appearing female in no acute distress  Neuro: alert and oriented  HEENT: sclera anicteric, oropharynx clear  Lymphatics: no cervical, supraclavicular, or axillary adenopathy  Cardiovascular: regular rate and rhythm, no murmurs  Lungs: clear to auscultation bilaterally  Abdomen: soft, nontender, nondistended.  No palpable organomegaly  Extremeties: no lower extremity edema  Skin: no rashes, lesions, bruising, or petechiae  Psych: mood and affect appropriate    I have reexamined the patient and the results are consistent with the previously documented exam. Angela Horvath MD    RECENT LABS:  Lab Results   Component Value Date    WBC 13.44 (H) 02/13/2025    HGB 11.1 (L) 02/13/2025    HCT 32.9 (L) 02/13/2025    MCV 88.9 02/13/2025     02/13/2025       Lab Results   Component Value Date    GLUCOSE 155 " (H) 02/13/2025    BUN 10 02/13/2025    CREATININE 0.79 02/13/2025    BCR 12.7 02/13/2025    K 4.5 02/13/2025    CO2 22.0 02/13/2025    CALCIUM 10.1 02/13/2025    ALBUMIN 4.6 02/13/2025    AST 42 (H) 02/13/2025    ALT 69 (H) 02/13/2025           ASSESSMENT AND PLAN:     Eliane Argueta is a 35 y.o. female with a T2 N1 M0 ER positive HER2 negative invasive ductal carcinoma of the left breast.     We will proceed with cycle 4.  Continue Tylenol for management of growth factor related bone pain.    Hot flashes: We discussed that suppression of the ovaries is a common side effect of chemotherapy.  This may get better with time, but we plan to keep her in a menopausal state as part of her endocrine therapy for treatment of her breast cancer, so hot flashes will likely be an ongoing issue.  She is interested in trying Veozah.  I will send a prescription for this.  We discussed that we will need to monitor her LFTs.  She had a mild elevation at baseline so as long as these do not change significantly I think we can safely continue this drug.  We will plan to repeat labs in 1 month.  She will check labs monthly for the first 3 months.    Depression: Continue Wellbutrin per her PCP.    We discussed her labs today which show mild transaminase elevation.  We will continue to monitor this.    After completion of chemotherapy, she is a candidate for radiation therapy, followed by endocrine therapy with ovarian suppression along with anastrozole, and the addition of Kisqali for 3 years.  She has appointment with Dr. Sherwood on 2/18.    Follow-up in early May.      Angela Horvath MD  [facility] Hematology and Oncology    2/13/2025

## 2025-02-14 ENCOUNTER — HOSPITAL ENCOUNTER (OUTPATIENT)
Facility: HOSPITAL | Age: 35
Setting detail: RADIATION/ONCOLOGY SERIES
End: 2025-02-14
Payer: MEDICAID

## 2025-02-18 ENCOUNTER — OFFICE VISIT (OUTPATIENT)
Age: 35
End: 2025-02-18
Payer: MEDICAID

## 2025-02-18 VITALS
HEART RATE: 114 BPM | WEIGHT: 206.7 LBS | OXYGEN SATURATION: 96 % | DIASTOLIC BLOOD PRESSURE: 73 MMHG | BODY MASS INDEX: 32.44 KG/M2 | HEIGHT: 67 IN | TEMPERATURE: 98.1 F | SYSTOLIC BLOOD PRESSURE: 114 MMHG | RESPIRATION RATE: 16 BRPM

## 2025-02-18 DIAGNOSIS — C50.212 CARCINOMA OF UPPER-INNER QUADRANT OF FEMALE BREAST, LEFT: Primary | ICD-10-CM

## 2025-02-18 PROCEDURE — G0463 HOSPITAL OUTPT CLINIC VISIT: HCPCS

## 2025-02-18 NOTE — PROGRESS NOTES
"                                       Follow Up Office Visit      Encounter Date: 02/18/2025   Patient Name: Eliane Argueta  YOB: 1990   Medical Record Number: [id number]   Primary Diagnosis: No primary diagnosis found.   Cancer Staging: Cancer Staging   No matching staging information was found for the patient.                Cancer Staging   No matching staging information was found for the patient.          Chief Complaint:    Chief Complaint   Patient presents with    Breast Cancer       Oncologic History: Eliane Argueta is a 35 y.o. female here for follow up regarding her invasive ductal carcinoma of the left breast.      She is accompanied by her spouse today. They are both working parents and have teenage children.  Her initial diagnostic imaging and biopsy was performed at an outside facility, with biopsy of a left breast returning as a +/-/- gr 3 invasive ductal carcinoma with LVI.   Dr. Micki Arrieta performed bilateral mastectomies with a left SLN eval and subsequent axillary dissection. Pathology returned benign from the right breast. The left breast had a gr 3 invasive ductal carcinoma (3.5 cm) with extensive ductal/lbular spread resected with negative margins. 1/2 SLN were involved and 0/2 additional axillary nodes dissected were involved.   [id number]    Interval History: Completed 4 cycles of adjuvant chemo; Having fatigue/hot flashes  Was initially interested in delayed reconstruction but would like to speak with Dr. NELY jain.   Recent viral illness    Prior Radiation:       Subjective      Review of Systems: Review of Systems   Constitutional:  Positive for fatigue.   Cardiovascular:  Positive for palpitations.        Patient's HR has been slightly tachy in the last 2 months; pt's PCP completed multiple EKG's that patient reports as \"normal HR, just fast HR\" last month when she had the Flu; pt reports some int palpitations; PCP will monitor & refer to Cardiology if needed. " "  Gastrointestinal:  Positive for nausea.        Pt reports still having some mild, int nausea r/t chemo; pt manages w/ zofran.   Endocrine:        Pt reports increase hot flashes recently; managed by Med/Onc; pt recently started Veozah   Musculoskeletal:         Pt reports mild bone pain & int, mild pain in Left Breast that she feels is r/t a possible seroma; will f/u w/ Dr. MCKAY   Neurological:  Positive for numbness.        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   Psychiatric/Behavioral:  Positive for sleep disturbance.        The following portions of the patient's history were reviewed and updated as appropriate: allergies, current medications, past family history, past medical history, past social history, past surgical history and problem list.    Measures:   KPS/Quality of Life: 80 - Restricted Physical Activity      Objective     Physical Exam:   Vital Signs:   Vitals:    02/18/25 1020   BP: 114/73   Pulse: 114   Resp: 16   Temp: 98.1 °F (36.7 °C)   TempSrc: Oral   SpO2: 96%   Weight: 93.8 kg (206 lb 11.2 oz)   Height: 170.2 cm (67\")   PainSc: 1    PainLoc: Breast     Body mass index is 32.37 kg/m².     Constitutional: No acute distress, sitting comfortably  Eye: EOMI, anicteric sclerae  HENT: NC/AT, MMM   Respiratory: Symmetric expansion, nonlabored respiration  MSK: ROM intact in all four extremities, no obvious deformities  Neuro: Alert, oriented x3, CN3-12 grossly intact.   Psych: Appropriate mood and affect.  Chest wall: small seroma involving right chest wall, no erythema or warmth          Assessment / Plan        Assessment/Plan:     There are no diagnoses linked to this encounter.    Eliane Argueta is a pleasant 35 y.o. female with a gr 3 +/-/- [id number] invasive ductal carcinoma of the left breast managed with bilateral mastectomies and a left SLN eval/axillary LN excision of 2 additional nodes on " 11/12/24. Fortunately margins were negative.   We discussed oncologic benefits of post mastectomy radiation in women with node positive disease. This is in accordance with recommendations from NCCN guidelines. We discussed the process of simulation and treatment as well as the acute and late toxicity profile of treatment including secondary malignancy risk.  She is interested in proceeding.  She is tentatively interested in delayed reconstruction but would like to discuss this with Dr. MCKAY. Once she has finalized her reconstructive plan, we will move forward with CT simulation, tentatively scheduled in early March. Consent for treatment was obtained today.       Follow Up:   No follow-ups on file.        Time:   I spent 35 minutes on this encounter today, 02/18/25. Activities that took place during this time include:   - preparing to see the patient  - obtaining and reviewing separately obtained history  - performing a medically appropriate examination and evaluation  - counseling and educating the patient  - ordering medications/tests/procedures  - communicating with other healthcare providers  - documenting clinical information in the health record  - coordinating care for this patient.     Sincerely,        Sobia Sherwood MD  Radiation Oncology  This document has been signed by Sobia Sherwood MD on February 18, 2025 12:57 EST           NOTICE TO PATIENTS  At [facility], we believe that sharing information builds trust and better relationships. You are receiving this note because you recently visited [facility]. It is possible you will see health information before a provider has talked with you about it. This kind of information can be easy to misunderstand. To help you fully understand what it means for your health, we urge you to discuss this note with your provider.

## 2025-02-19 ENCOUNTER — DOCUMENTATION (OUTPATIENT)
Dept: OTHER | Facility: HOSPITAL | Age: 35
End: 2025-02-19
Payer: MEDICAID

## 2025-02-19 NOTE — PROGRESS NOTES
SW reached out to pt to discuss referral over resources available. SW explained at this time we have utilized all resources available to help with living expenses. SW further reported when radiation plan is in place, and can see what resources are available to further assist. Pt was agreeable to plan, and stated she needs to talk with DR. MCKAY about reconstruction plan before knowing what tx plan will be. Pt was agreeable to reach out if other needs arise. SW will be available ongoing.

## 2025-03-05 ENCOUNTER — HOSPITAL ENCOUNTER (OUTPATIENT)
Dept: PHYSICAL THERAPY | Facility: HOSPITAL | Age: 35
Setting detail: THERAPIES SERIES
Discharge: HOME | End: 2025-03-05
Payer: MEDICAID

## 2025-03-05 ENCOUNTER — TRANSCRIBE ORDERS (OUTPATIENT)
Dept: PHYSICAL THERAPY | Facility: HOSPITAL | Age: 35
End: 2025-03-05
Payer: MEDICAID

## 2025-03-05 DIAGNOSIS — C50.212 MALIGNANT NEOPLASM OF UPPER-INNER QUADRANT OF LEFT FEMALE BREAST, UNSPECIFIED ESTROGEN RECEPTOR STATUS: Primary | ICD-10-CM

## 2025-03-05 PROCEDURE — 97162 PT EVAL MOD COMPLEX 30 MIN: CPT

## 2025-03-05 PROCEDURE — 93702 BIS XTRACELL FLUID ANALYSIS: CPT

## 2025-03-05 NOTE — THERAPY TREATMENT NOTE
Outpatient Physical Therapy Lymphedema  Compression garment request   [place]     Patient Name: Eliane Argueta  : 1990  MRN: [id number]  Today's Date: 3/5/2025        Visit Date: 2025    Visit Dx:    ICD-10-CM ICD-9-CM   1. Malignant neoplasm of upper-inner quadrant of left female breast, unspecified estrogen receptor status  C50.212 174.2       Patient Active Problem List   Diagnosis    Carcinoma of upper-inner quadrant of female breast, left       Dear: Dr. Hannah Rooney MD    Your patient potentially qualifies from insurance assistance in acquiring compression items. However, insurances request a signed order from the overseeing MD, PA, or APRN. Below is a completed order for these items that requires your signature. Please let me know if you have any questions or concerns.     Thank you for your assistance and your referral to lymphedema therapy.     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 Treatment requires a compression bra with full breast/lateral chest coverage. She needs a compression bra of low to medium weight to prevent progression during radiation and with additional surgery.     Accessory: Foam padding insert   Quantity 2   Brand/style: Solaris Full Bra Swell Spot   Size: Medium    Foam padding is needed to use in combination with her compression bra to provide better support to her edematous area. The incision line and space of the chest from her mastectomy will result in little compression provided by a compression bra. This insert is to improve direct compression to the edematous area to mobilize edema as well as provide protection to the fragile skin as this will also be used after radiation treatment.      Bernice  Rolando, PT, DPT, CLT-NATHALIA  [place] [address]  Phone: [phone]  Fax: [phone]  Email: [email or web address]      Bernice Marshall PT  3/5/2025            MD Signature: _________________________________________            Date: _______________     NPI: _________________________________________________

## 2025-03-05 NOTE — THERAPY DISCHARGE NOTE
Outpatient Physical Therapy Lymphedema Initial Evaluation & Discharge  [facility]     Patient Name: Eliane Argueta  : 1990  MRN: [id number]  Today's Date: 3/5/2025      Visit Date: 2025    Visit Dx:    ICD-10-CM ICD-9-CM   1. Malignant neoplasm of upper-inner quadrant of left female breast, unspecified estrogen receptor status  C50.212 174.2       Patient Active Problem List   Diagnosis    Carcinoma of upper-inner quadrant of female breast, left        Past Medical History:   Diagnosis Date    Anxiety     Breast cancer     Cancer 2024    LEFT - breast    Rheumatoid arthritis         Past Surgical History:   Procedure Laterality Date     SECTION      X'S 2    LAPAROSCOPIC TUBAL LIGATION      MASTECTOMY WITH SENTINEL NODE BIOPSY AND AXILLARY NODE DISSECTION Bilateral 2024    Procedure: SKIN SPARING MASTECTOMY, SENTINEL NODE BIOPSY AND AXILLARY NODE DISSECTION LEFT, PROPHYLACTIC RIGHT SKIN SPARING MASTECTOMY, LEFT AXILLARY NODE DISSECTION;  Surgeon: Hannah Rooney MD;  Location: [facility];  Service: General;  Laterality: Bilateral;    WISDOM TOOTH EXTRACTION          Patient History       Row Name 25 1055             History    Brief Description of Current Complaint BrCA post surgical evaluation  -CA         Pain     Pain at Present 4  -CA         Services    Prior Rehab/Home Health Experiences No  -CA      Are you currently receiving Home Health services No  -CA      Do you plan to receive Home Health services in the near future No  -CA         Daily Activities    Primary Language English  -CA      Are you able to read Yes  -CA      Are you able to write Yes  -CA      How does patient learn best? Pictures/Video;Demonstration;Reading;Listening  -CA      Pt Participated in POC and Goals Yes  -CA         Safety    Are you being hurt, hit, or frightened by anyone at home or in your life? No  -CA      Are you being neglected by a caregiver No  -CA                User Key   (r) = Recorded By, (t) = Taken By, (c) = Cosigned By      Initials Name Provider Type    CA Bernice Marshall, PT Physical Therapist                     Lymphedema       Row Name 03/05/25 1267             Subjective Pain    Able to rate subjective pain? yes  -CA      Pre-Treatment Pain Level 4  -CA      Post-Treatment Pain Level 4  -CA         Subjective    Subjective Comments Pt returns for a 3 month post surgical follow up per [facility] breast CA protocol. Pt has undergone a B mastectomy with SLNB, She also completed radiation and is undergoing adjuvant chemotherapy. She notes she is back to work requiring lifting/pushing/pulling. She has pain along the chest from a strained muscle and shoulder pain from arthritis. She notes the shoulder pain is normal prior to surgery and the strain is coming from her work demands. She had a seroma drained once on the L side but feels swollen again on the L chest and is Dr. NELY will have to drain that area again.  -CA         Lymphedema Assessment    Lymphedema Classification LUE:;at risk/stage 0;stage 1 (Spontaneously Reversible)  Stage 1 L chest lymphedema  -CA      Lymphedema Cancer Related Sx bilateral;simple mastectomy;left;sentinel node biopsy;axillary dissection  -CA      Lymph Nodes Removed # 4  -CA      Positive Lymph Nodes # 1  1:2 SLNB, 0:2 ALND  -CA      Chemo Received yes  -CA      Chemo Treatments #/Timeframe taxotere and cytoxan started 12/11/24.  -CA      Radiation Therapy Received --  TBD  -CA      Lymphedema Assessment Comments Mild puffiness noted along the inferior incision line towards the lateral aspect of the chest that is not present along the R chest. Non-pitting at this time. Adhered scar noted along the incision line.  -CA         Posture/Observations    Alignment Options Forward head;Rounded shoulders  -CA      Forward Head Mild  -CA      Rounded Shoulders Moderate;Bilateral:  -CA         General ROM    RT Upper Ext Rt Shoulder ABduction;Rt Shoulder  Flexion;Rt Shoulder External Rotation;Rt Shoulder Internal Rotation  -CA      LT Upper Ext Lt Shoulder ABduction;Lt Shoulder Flexion;Lt Shoulder External Rotation;Lt Shoulder Internal Rotation  -CA         Right Upper Ext    Rt Shoulder Abduction AROM WFL  -CA      Rt Shoulder Flexion AROM WFL  -CA      Rt Shoulder External Rotation AROM WFL  -CA      Rt Shoulder Internal Rotation AROM WFL  -CA         Left Upper Ext    Lt Shoulder Abduction AROM WFL  -CA      Lt Shoulder Flexion AROM WFL  -CA      Lt Shoulder External Rotation AROM WFL  -CA      Lt Shoulder Internal Rotation AROM WFL  -CA      Lt Upper Extremity Comments  tightness noted on the L compared to the R pec  -CA         MMT (Manual Muscle Testing)    Rt Upper Ext Rt Shoulder Flexion;Rt Shoulder ABduction;Rt Shoulder Internal Rotation;Rt Shoulder External Rotation  -CA      Lt Upper Ext Lt Shoulder Flexion;Lt Shoulder ABduction;Lt Shoulder Internal Rotation;Lt Shoulder External Rotation  -CA         MMT Right Upper Ext    Rt Shoulder Flexion MMT, Gross Movement (4/5) good  -CA      Rt Shoulder ABduction MMT, Gross Movement (4/5) good  -CA      Rt Shoulder Internal Rotation MMT, Gross Movement (4/5) good  -CA      Rt Shoulder External Rotation MMT, Gross Movement (4/5) good  -CA         MMT Left Upper Ext    Lt Shoulder Flexion MMT, Gross Movement (4/5) good  -CA      Lt Shoulder ABduction MMT, Gross Movement (4/5) good  -CA      Lt Shoulder Internal Rotation MMT, Gross Movement (4/5) good  -CA      Lt Shoulder External Rotation MMT, Gross Movement (4/5) good  -CA         Hand  Strength     Strength Affected Side Bilateral  -CA          Strength Right    Right  Test 1 30  -CA       Strength Average Right 30  -CA          Strength Left    Left  Test 1 25  -CA       Strength Average Left 25  -CA         Lymphedema Edema Assessment    Edema Assessment Comment No edema present at this time.  -CA         Lymphedema Sensation     Lymphedema Sensation Reports RUE:;LUE:  -CA      Lymphedema Sensation Tests light touch  -CA      Lymphedema Light Touch WNL  -CA         L-Dex Bioimpedence Screening    L-Dex Measurement Extremity LUE  -CA      L-Dex Patient Position Standing  -CA      L-Dex UE Dominate Side Right  -CA      L-Dex UE At Risk Side Left  -CA      L-Dex UE Pre Surgical Value Yes  -CA      L-Dex UE Score -2.3  -CA      L-Dex UE Baseline Score -3.9  -CA      L-Dex UE Value Change 1.6  -CA      L-Dex UE Comment The patient had SOZO measurement which I reviewed today. The score is in normal limits, see scanned to EMR. Bioimpedance spectroscopy helps identify the onset of lymphedema in an arm or leg before patients experience noticeable swelling. Research has shown that the early detection of lymphedema using L-Dex combined with treatment can reduce progression to chronic lymphedema by 95% in breast cancer patients. Whenever possible, patients are tested for baseline L-Dex score before cancer treatment begins and then are reassessed during regular follow-up visits using the SOZO device. If the patient’s L-Dex score increases above normal levels, that is a sign that lymphedema is developing and a referral is made to occupational or physical therapy for further evaluation and early compression treatment. Lymphedema assessment with the SOZO L-Dex score is recommended to be done every 3 months for the first 3 years and then every 6 months for years 4 and 5 followed by annually afterwards. This score is for 3 months after this patient’s surgical intervention, a high-risk period of time for potential lymphedema development.  -CA      Skeletal Muscle Mass (%) 25.4 %  -CA      Fat Mass (%) 36 %  -CA      Hy-dex 2  -CA                User Key  (r) = Recorded By, (t) = Taken By, (c) = Cosigned By      Initials Name Provider Type    Bernice Alexander PT Physical Therapist                    Therapy Education  Education Details: Pt was educated on  the results of this assessment compared to the initial evaluation as well as changes that present. Reinforced continued monitoring for edema. Advised on the need for long term strengthening and cardiovascular exercise during and after oncology care. Reviewed ACSM guidelines for aerobic activity. Pt declined therapy to assist with her strained chest muscle and her shoulder pain. Advised that she can notify PT at any point if she desires further care. Emphasized HEP as long as Dr. MCKAY does not add any limits related to her seroma history.  Given: Symptoms/condition management, Edema management  Program: New  How Provided: Verbal, Demonstration  Provided to: Patient  Level of Understanding: Verbalized     OP Exercises       Row Name 03/05/25 5501             Subjective    Subjective Comments Pt returns for a 3 month post surgical follow up per [facility] breast CA protocol. Pt has undergone a B mastectomy with SLNB, She also completed radiation and is undergoing adjuvant chemotherapy. She notes she is back to work requiring lifting/pushing/pulling. She has pain along the chest from a strained muscle and shoulder pain from arthritis. She notes the shoulder pain is normal prior to surgery and the strain is coming from her work demands. She had a seroma drained once on the L side but feels swollen again on the L chest and is Dr. MCKAY will have to drain that area again.  -CA         Subjective Pain    Able to rate subjective pain? yes  -CA      Pre-Treatment Pain Level 4  -CA      Post-Treatment Pain Level 4  -CA         Exercise 8    Exercise Name 8 wall walks for shoulder flexion  -CA      Reps 8 x10  -CA      Additional Comments 3-4x/day, HEP level 3- post op day 14 until motion is returned  -CA         Exercise 9    Exercise Name 9 IR with hands behind back  -CA      Reps 9 x10  -CA      Additional Comments 3-4x/day, HEP level 3- post op day 14 until motion is returned  -CA         Exercise 10    Exercise Name 10 Horizontal  abduction with hands behind head  -CA      Reps 10 x10  -CA      Additional Comments 3-4x/day, HEP level 3- post op day 14 until motion is returned  -CA         Exercise 11    Exercise Name 11 PNF D1 shoulder flexion  -CA      Reps 11 x10  -CA      Additional Comments 3-4x/day, HEP level 3- post op day 14 until motion is returned  -CA         Exercise 12    Exercise Name 12 Trunk stretch with shoulder abduction  -CA      Reps 12 x10  -CA      Time 12 5 seconds  -CA      Additional Comments 3-4x/day, HEP level 3- post op day 14 until motion is returned  -CA                User Key  (r) = Recorded By, (t) = Taken By, (c) = Cosigned By      Initials Name Provider Type    Bernice Alexander, PT Physical Therapist                       PT OP Goals       Row Name 03/05/25 1059          Long Term Goals    LTG Date to Achieve 03/05/25  -CA     LTG 1 Pt to demonstrate an awareness of long term management of UE function for post-operative care related to breast cancer.  -CA     LTG 1 Progress Met  -CA     LTG 2 Pt to demonstrate understanding of long term edema monitoring to respond appropriately if symptoms develop.  -CA     LTG 2 Progress Met  -CA        Time Calculation    PT Goal Re-Cert Due Date 03/05/25  -CA               User Key  (r) = Recorded By, (t) = Taken By, (c) = Cosigned By      Initials Name Provider Type    Bernice Alexander, PT Physical Therapist                     PT Assessment/Plan       Row Name 03/05/25 1058          PT Assessment    Assessment Comments This patient presents to PT for assessment after her 3rd month status post BrCA surgical intervention. Post-operative ROM, postural, functional, and bioimpedance measurements were taken today to be compared to measurements at baseline before surgery. Any deficits related to these areas were addressed today with focus on early intervention to avoid post-surgical complications. Personal risk factors for lymphedema post-operatively for the L upper  extremity and trunk quadrant and lymphedema risk reduction guidelines were reviewed today. Currently, this patient is able to self-manage with the information provided today. However, she may return for additional management if her functional status changes. Routine assessments for functional status is being performed to refer to the appropriate therapy if concerns present. Pt presents with signs of stage 1 lymphedema of the L chest related to breast CA and surgical intervention. She would benefit from compression therapy to assist with control as she had radiation and addtional surgery to proceed with before she is done with cancer care.  -CA     Please refer to paper survey for additional self-reported information Yes  -CA     Rehab Potential Good  -CA     Patient/caregiver participated in establishment of treatment plan and goals Yes  -CA     Patient would benefit from skilled therapy intervention Yes  -CA        PT Plan    PT Frequency One time visit  -CA     Planned CPT's? PT EVAL MOD COMPLELITY: 98124;PT THER PROC EA 15 MIN: 53255;PT THER ACT EA 15 MIN: 18777;PT MANUAL THERAPY EA 15 MIN: 80733;PT SELF CARE/HOME MGMT/TRAIN EA 15: 82845;PT BIS XTRACELL FLUID ANALYSIS: 03240  -CA     PT Plan Comments This patient may return to PT in 3 months for re-evaluation measurements (6 months post operatively) to be compared to measurements taken today and at her presurgical baseline. In addition, she can be examined for possible post-BrCA surgery sequelae such as axillary web syndrome, scar adhesions, edema, worsened posture, scapular winging, pain, and reduced ROM and function. At that time, a future plan and goals will be established and skilled care continued if indicated. Currently, this POC has been provided with a focus to facilitate recovery and reduce the risk of post-operative sequelae.  -CA               User Key  (r) = Recorded By, (t) = Taken By, (c) = Cosigned By      Initials Name Provider Type    CA  Bernice Marshall, PT Physical Therapist                     Outcome Measure Options: Quick DASH, Timed Up and Go (TUG)  Quick DASH  Open a tight or new jar.: Moderate Difficulty  Do heavy household chores (e.g., wash walls, wash floors): No Difficulty  Carry a shopping bag or briefcase: No Difficulty  Wash your back: No Difficulty  Use a knife to cut food: No Difficulty  Recreational activities in which you take some force or impact through your arm, should or hand (e.g. golf, hammering, tennis, etc.): Moderate Difficulty  During the past week, to what extent has your arm, shoulder, or hand problem interfered with your normal social activites with family, friends, neighbors or groups?: Slightly  During the past week, were you limited in your work or other regular daily activities as a result of your arm, shoulder or hand problem?: Slightly Limited  Arm, Shoulder, or hand pain: Mild  Tingling (pins and needles) in your arm, shoulder, or hand: Moderate  During the past week, how much difficulty have you had sleeping because of the pain in your arm, shoulder or hand?: No difficulty  Number of Questions Answered: 11  Quick DASH Score: 20.45  Timed Up and Go (TUG)  TUG Test 1: 9.5 seconds      Time Calculation:   Start Time: 1055  Stop Time: 1120  Time Calculation (min): 25 min  Untimed Charges  PT Eval/Re-eval Minutes: 25  Total Minutes  Untimed Charges Total Minutes: 25   Total Minutes: 25   Time calculation does not account for 15 minutes documentation time    Therapy Charges for Today       Code Description Service Date Service Provider Modifiers Qty    38844749283 HC PT EVAL MOD COMPLEXITY 3 3/5/2025 Bernice Marshall, PT GP 1    [id number] HC PT BIS XTRACELL FLUID ANALYSIS 3/5/2025 Bernice Marshall, PT  1            PT G-Codes  Outcome Measure Options: Quick DASH, Timed Up and Go (TUG)  Quick DASH Score: 20.45  TUG Test 1: 9.5 seconds     Garment Justification:     ICD 10- I89.0 (lymphedema) , I97.2 (Post  mastectomy Lymphedema)    Gradient Compression garment, bra/torso, ready to wear   Quantity 3   Brand/Style: Priarie Wear Prima    Size: Large    Pt presents with post mastectomy lymphedema of the L anterior/lateral chest warranting compression care for management. Treatment requires a compression bra with full breast/lateral chest coverage. She needs a compression bra of low to medium weight to prevent progression during radiation and with additional surgery.     Accessory: Foam padding insert   Quantity 2   Brand/style: Solaris Full Bra Swell Spot   Size: Medium    Foam padding is needed to use in combination with her compression bra to provide better support to her edematous area. The incision line and space of the chest from her mastectomy will result in little compression provided by a compression bra. This insert is to improve direct compression to the edematous area to mobilize edema as well as provide protection to the fragile skin as this will also be used after radiation treatment.               Bernice Marshall, PT  3/5/2025

## 2025-03-05 NOTE — THERAPY DISCHARGE NOTE
Outpatient Physical Therapy Lymphedema Initial Evaluation & Discharge  [facility]     Patient Name: Eliane Argueta  : 1990  MRN: [id number]  Today's Date: 3/5/2025      Visit Date: 2025    Visit Dx:    ICD-10-CM ICD-9-CM   1. Malignant neoplasm of upper-inner quadrant of left female breast, unspecified estrogen receptor status  C50.212 174.2       Patient Active Problem List   Diagnosis    Carcinoma of upper-inner quadrant of female breast, left        Past Medical History:   Diagnosis Date    Anxiety     Breast cancer     Cancer 2024    LEFT - breast    Rheumatoid arthritis         Past Surgical History:   Procedure Laterality Date     SECTION      X'S 2    LAPAROSCOPIC TUBAL LIGATION      MASTECTOMY WITH SENTINEL NODE BIOPSY AND AXILLARY NODE DISSECTION Bilateral 2024    Procedure: SKIN SPARING MASTECTOMY, SENTINEL NODE BIOPSY AND AXILLARY NODE DISSECTION LEFT, PROPHYLACTIC RIGHT SKIN SPARING MASTECTOMY, LEFT AXILLARY NODE DISSECTION;  Surgeon: Hannah Rooney MD;  Location: [facility];  Service: General;  Laterality: Bilateral;    WISDOM TOOTH EXTRACTION          Patient History       Row Name 25 1055             History    Brief Description of Current Complaint BrCA post surgical evaluation  -CA         Pain     Pain at Present 4  -CA         Services    Prior Rehab/Home Health Experiences No  -CA      Are you currently receiving Home Health services No  -CA      Do you plan to receive Home Health services in the near future No  -CA         Daily Activities    Primary Language English  -CA      Are you able to read Yes  -CA      Are you able to write Yes  -CA      How does patient learn best? Pictures/Video;Demonstration;Reading;Listening  -CA      Pt Participated in POC and Goals Yes  -CA         Safety    Are you being hurt, hit, or frightened by anyone at home or in your life? No  -CA      Are you being neglected by a caregiver No  -CA                User Key   (r) = Recorded By, (t) = Taken By, (c) = Cosigned By      Initials Name Provider Type    CA Bernice Marshall, PT Physical Therapist                     Lymphedema       Row Name 03/05/25 2646             Subjective Pain    Able to rate subjective pain? yes  -CA      Pre-Treatment Pain Level 4  -CA      Post-Treatment Pain Level 4  -CA         Subjective    Subjective Comments Pt returns for a 3 month post surgical follow up per [facility] breast CA protocol. Pt has undergone a B mastectomy with SLNB, She also completed radiation and is undergoing adjuvant chemotherapy. She notes she is back to work requiring lifting/pushing/pulling. She has pain along the chest from a strained muscle and shoulder pain from arthritis. She notes the shoulder pain is normal prior to surgery and the strain is coming from her work demands. She had a seroma drained once on the L side but feels swollen again on the L chest and is Dr. NELY will have to drain that area again.  -CA         Lymphedema Assessment    Lymphedema Classification LUE:;at risk/stage 0  -CA      Lymphedema Cancer Related Sx bilateral;simple mastectomy;left;sentinel node biopsy;axillary dissection  -CA      Lymph Nodes Removed # 4  -CA      Positive Lymph Nodes # 1  1:2 SLNB, 0:2 ALND  -CA      Chemo Received yes  -CA      Chemo Treatments #/Timeframe taxotere and cytoxan started 12/11/24.  -CA      Radiation Therapy Received --  TBD  -CA      Lymphedema Assessment Comments Mild puffiness noted along the inferior incision line towards the lateral aspect of the chest. Non-pitting. Adhered scar noted along the incision line.  -CA         Posture/Observations    Alignment Options Forward head;Rounded shoulders  -CA      Forward Head Mild  -CA      Rounded Shoulders Moderate;Bilateral:  -CA         General ROM    RT Upper Ext Rt Shoulder ABduction;Rt Shoulder Flexion;Rt Shoulder External Rotation;Rt Shoulder Internal Rotation  -CA      LT Upper Ext Lt Shoulder ABduction;Lt  Shoulder Flexion;Lt Shoulder External Rotation;Lt Shoulder Internal Rotation  -CA         Right Upper Ext    Rt Shoulder Abduction AROM WFL  -CA      Rt Shoulder Flexion AROM WFL  -CA      Rt Shoulder External Rotation AROM WFL  -CA      Rt Shoulder Internal Rotation AROM WFL  -CA         Left Upper Ext    Lt Shoulder Abduction AROM WFL  -CA      Lt Shoulder Flexion AROM WFL  -CA      Lt Shoulder External Rotation AROM WFL  -CA      Lt Shoulder Internal Rotation AROM WFL  -CA      Lt Upper Extremity Comments  tightness noted on the L compared to the R pec  -CA         MMT (Manual Muscle Testing)    Rt Upper Ext Rt Shoulder Flexion;Rt Shoulder ABduction;Rt Shoulder Internal Rotation;Rt Shoulder External Rotation  -CA      Lt Upper Ext Lt Shoulder Flexion;Lt Shoulder ABduction;Lt Shoulder Internal Rotation;Lt Shoulder External Rotation  -CA         MMT Right Upper Ext    Rt Shoulder Flexion MMT, Gross Movement (4/5) good  -CA      Rt Shoulder ABduction MMT, Gross Movement (4/5) good  -CA      Rt Shoulder Internal Rotation MMT, Gross Movement (4/5) good  -CA      Rt Shoulder External Rotation MMT, Gross Movement (4/5) good  -CA         MMT Left Upper Ext    Lt Shoulder Flexion MMT, Gross Movement (4/5) good  -CA      Lt Shoulder ABduction MMT, Gross Movement (4/5) good  -CA      Lt Shoulder Internal Rotation MMT, Gross Movement (4/5) good  -CA      Lt Shoulder External Rotation MMT, Gross Movement (4/5) good  -CA         Hand  Strength     Strength Affected Side Bilateral  -CA          Strength Right    Right  Test 1 30  -CA       Strength Average Right 30  -CA          Strength Left    Left  Test 1 25  -CA       Strength Average Left 25  -CA         Lymphedema Edema Assessment    Edema Assessment Comment No edema present at this time.  -CA         Lymphedema Sensation    Lymphedema Sensation Reports RUE:;LUE:  -CA      Lymphedema Sensation Tests light touch  -CA      Lymphedema Light  Touch WNL  -CA         L-Dex Bioimpedence Screening    L-Dex Measurement Extremity LUE  -CA      L-Dex Patient Position Standing  -CA      L-Dex UE Dominate Side Right  -CA      L-Dex UE At Risk Side Left  -CA      L-Dex UE Pre Surgical Value Yes  -CA      L-Dex UE Score -2.3  -CA      L-Dex UE Baseline Score -3.9  -CA      L-Dex UE Value Change 1.6  -CA      L-Dex UE Comment The patient had SOZO measurement which I reviewed today. The score is in normal limits, see scanned to EMR. Bioimpedance spectroscopy helps identify the onset of lymphedema in an arm or leg before patients experience noticeable swelling. Research has shown that the early detection of lymphedema using L-Dex combined with treatment can reduce progression to chronic lymphedema by 95% in breast cancer patients. Whenever possible, patients are tested for baseline L-Dex score before cancer treatment begins and then are reassessed during regular follow-up visits using the SOZO device. If the patient’s L-Dex score increases above normal levels, that is a sign that lymphedema is developing and a referral is made to occupational or physical therapy for further evaluation and early compression treatment. Lymphedema assessment with the SOZO L-Dex score is recommended to be done every 3 months for the first 3 years and then every 6 months for years 4 and 5 followed by annually afterwards. This score is for 3 months after this patient’s surgical intervention, a high-risk period of time for potential lymphedema development.  -CA      Skeletal Muscle Mass (%) 25.4 %  -CA      Fat Mass (%) 36 %  -CA      Hy-dex 2  -CA                User Key  (r) = Recorded By, (t) = Taken By, (c) = Cosigned By      Initials Name Provider Type    Bernice Alexander PT Physical Therapist                    Therapy Education  Education Details: Pt was educated on the results of this assessment compared to the initial evaluation as well as changes that present. Reinforced  continued monitoring for edema. Advised on the need for long term strengthening and cardiovascular exercise during and after oncology care. Reviewed ACSM guidelines for aerobic activity. Pt declined therapy to assist with her strained chest muscle and her shoulder pain. Advised that she can notify PT at any point if she desires further care. Emphasized HEP as long as Dr. MCKAY does not add any limits related to her seroma history.  Given: Symptoms/condition management, Edema management  Program: New  How Provided: Verbal, Demonstration  Provided to: Patient  Level of Understanding: Verbalized     OP Exercises       Row Name 03/05/25 5008             Subjective    Subjective Comments Pt returns for a 3 month post surgical follow up per [facility] breast CA protocol. Pt has undergone a B mastectomy with SLNB, She also completed radiation and is undergoing adjuvant chemotherapy. She notes she is back to work requiring lifting/pushing/pulling. She has pain along the chest from a strained muscle and shoulder pain from arthritis. She notes the shoulder pain is normal prior to surgery and the strain is coming from her work demands. She had a seroma drained once on the L side but feels swollen again on the L chest and is Dr. MCKAY will have to drain that area again.  -CA         Subjective Pain    Able to rate subjective pain? yes  -CA      Pre-Treatment Pain Level 4  -CA      Post-Treatment Pain Level 4  -CA         Exercise 8    Exercise Name 8 wall walks for shoulder flexion  -CA      Reps 8 x10  -CA      Additional Comments 3-4x/day, HEP level 3- post op day 14 until motion is returned  -CA         Exercise 9    Exercise Name 9 IR with hands behind back  -CA      Reps 9 x10  -CA      Additional Comments 3-4x/day, HEP level 3- post op day 14 until motion is returned  -CA         Exercise 10    Exercise Name 10 Horizontal abduction with hands behind head  -CA      Reps 10 x10  -CA      Additional Comments 3-4x/day, HEP level 3- post  op day 14 until motion is returned  -CA         Exercise 11    Exercise Name 11 PNF D1 shoulder flexion  -CA      Reps 11 x10  -CA      Additional Comments 3-4x/day, HEP level 3- post op day 14 until motion is returned  -CA         Exercise 12    Exercise Name 12 Trunk stretch with shoulder abduction  -CA      Reps 12 x10  -CA      Time 12 5 seconds  -CA      Additional Comments 3-4x/day, HEP level 3- post op day 14 until motion is returned  -CA                User Key  (r) = Recorded By, (t) = Taken By, (c) = Cosigned By      Initials Name Provider Type    Bernice Alexander, PT Physical Therapist                     PT OP Goals       Row Name 03/05/25 1055          Long Term Goals    LTG Date to Achieve 03/05/25  -CA     LTG 1 Pt to demonstrate an awareness of long term management of UE function for post-operative care related to breast cancer.  -CA     LTG 1 Progress Met  -CA     LTG 2 Pt to demonstrate understanding of long term edema monitoring to respond appropriately if symptoms develop.  -CA     LTG 2 Progress Met  -CA        Time Calculation    PT Goal Re-Cert Due Date 03/05/25  -CA               User Key  (r) = Recorded By, (t) = Taken By, (c) = Cosigned By      Initials Name Provider Type    Bernice Alexander, PT Physical Therapist                     PT Assessment/Plan       Row Name 03/05/25 1057          PT Assessment    Assessment Comments This patient presents to PT for assessment after her 3rd month status post BrCA surgical intervention. Post-operative ROM, postural, functional, and bioimpedance measurements were taken today to be compared to measurements at baseline before surgery. Any deficits related to these areas were addressed today with focus on early intervention to avoid post-surgical complications. Personal risk factors for lymphedema post-operatively for the L upper extremity and trunk quadrant and lymphedema risk reduction guidelines were reviewed today. Currently, this patient  is able to self-manage with the information provided today. However, she may return for additional management if her functional status changes. Routine assessments for functional status is being performed to refer to the appropriate therapy if concerns present.  -CA     Please refer to paper survey for additional self-reported information Yes  -CA     Rehab Potential Good  -CA     Patient/caregiver participated in establishment of treatment plan and goals Yes  -CA     Patient would benefit from skilled therapy intervention Yes  -CA        PT Plan    PT Frequency One time visit  -CA     Planned CPT's? PT EVAL MOD COMPLELITY: 41892;PT THER PROC EA 15 MIN: 40955;PT THER ACT EA 15 MIN: 99376;PT MANUAL THERAPY EA 15 MIN: 54459;PT SELF CARE/HOME MGMT/TRAIN EA 15: 27719;PT BIS XTRACELL FLUID ANALYSIS: 88314  -CA     PT Plan Comments This patient may return to PT in 3 months for re-evaluation measurements (6 months post operatively) to be compared to measurements taken today and at her presurgical baseline. In addition, she can be examined for possible post-BrCA surgery sequelae such as axillary web syndrome, scar adhesions, edema, worsened posture, scapular winging, pain, and reduced ROM and function. At that time, a future plan and goals will be established and skilled care continued if indicated. Currently, this POC has been provided with a focus to facilitate recovery and reduce the risk of post-operative sequelae.  -CA               User Key  (r) = Recorded By, (t) = Taken By, (c) = Cosigned By      Initials Name Provider Type    Bernice Alexander PT Physical Therapist                     Outcome Measure Options: Quick DASH, Timed Up and Go (TUG)  Quick DASH  Open a tight or new jar.: Moderate Difficulty  Do heavy household chores (e.g., wash walls, wash floors): No Difficulty  Carry a shopping bag or briefcase: No Difficulty  Wash your back: No Difficulty  Use a knife to cut food: No Difficulty  Recreational  activities in which you take some force or impact through your arm, should or hand (e.g. golf, hammering, tennis, etc.): Moderate Difficulty  During the past week, to what extent has your arm, shoulder, or hand problem interfered with your normal social activites with family, friends, neighbors or groups?: Slightly  During the past week, were you limited in your work or other regular daily activities as a result of your arm, shoulder or hand problem?: Slightly Limited  Arm, Shoulder, or hand pain: Mild  Tingling (pins and needles) in your arm, shoulder, or hand: Moderate  During the past week, how much difficulty have you had sleeping because of the pain in your arm, shoulder or hand?: No difficulty  Number of Questions Answered: 11  Quick DASH Score: 20.45  Timed Up and Go (TUG)  TUG Test 1: 9.5 seconds      Time Calculation:   Start Time: 1055  Stop Time: 1120  Time Calculation (min): 25 min  Untimed Charges  PT Eval/Re-eval Minutes: 25  Total Minutes  Untimed Charges Total Minutes: 25   Total Minutes: 25   Time calculation does not account for 15 minutes documentation time    Therapy Charges for Today       Code Description Service Date Service Provider Modifiers Qty    21952842705 HC PT EVAL MOD COMPLEXITY 3 3/5/2025 Bernice Marshall, PT GP 1    [id number] HC PT BIS XTRACELL FLUID ANALYSIS 3/5/2025 Bernice Marshall, PT  1            PT G-Codes  Outcome Measure Options: Quick DASH, Timed Up and Go (TUG)  Quick DASH Score: 20.45  TUG Test 1: 9.5 seconds            Bernice Marshall PT  3/5/2025

## 2025-03-12 ENCOUNTER — TELEPHONE (OUTPATIENT)
Age: 35
End: 2025-03-12
Payer: MEDICAID

## 2025-03-12 NOTE — TELEPHONE ENCOUNTER
Call to patient to follow up on her plan for reconstruction after discussing with Dr. VERA. Patient states she is planning to move forward with radiation but that she does not have an appt yet scheduled with plastic surgery. She had follow up with Dr. VERA on 3/5/25 and isn't sure if a referral was made at that time. She states that she is still interested in reconstruction and would like to know what her plan is moving forward so that she can plan regarding work, etc. She understands that she will need delayed reconstruction if she has radiation but would like to know her plan. Message sent to Dr. Vera's nurse, Henrique, to inquire about referral status.

## 2025-03-13 ENCOUNTER — HOSPITAL ENCOUNTER (OUTPATIENT)
Facility: HOSPITAL | Age: 35
Setting detail: RADIATION/ONCOLOGY SERIES
End: 2025-03-13
Payer: MEDICAID

## 2025-03-18 ENCOUNTER — HOSPITAL ENCOUNTER (OUTPATIENT)
Facility: HOSPITAL | Age: 35
Discharge: HOME | End: 2025-03-18

## 2025-03-18 DIAGNOSIS — C50.212 CARCINOMA OF UPPER-INNER QUADRANT OF FEMALE BREAST, LEFT: ICD-10-CM

## 2025-03-18 DIAGNOSIS — C50.212 CARCINOMA OF UPPER-INNER QUADRANT OF FEMALE BREAST, LEFT: Primary | ICD-10-CM

## 2025-03-18 LAB — B-HCG UR QL: NEGATIVE

## 2025-03-18 PROCEDURE — 77332 RADIATION TREATMENT AID(S): CPT | Performed by: RADIOLOGY

## 2025-03-18 PROCEDURE — 81025 URINE PREGNANCY TEST: CPT | Performed by: RADIOLOGY

## 2025-03-18 PROCEDURE — 77290 THER RAD SIMULAJ FIELD CPLX: CPT | Performed by: RADIOLOGY

## 2025-03-26 DIAGNOSIS — C50.212 CARCINOMA OF UPPER-INNER QUADRANT OF FEMALE BREAST, LEFT: ICD-10-CM

## 2025-03-26 DIAGNOSIS — Z51.11 ENCOUNTER FOR ANTINEOPLASTIC CHEMOTHERAPY: ICD-10-CM

## 2025-03-26 RX ORDER — LORATADINE 10 MG/1
TABLET ORAL
Qty: 30 TABLET | Refills: 1 | Status: SHIPPED | OUTPATIENT
Start: 2025-03-26

## 2025-03-28 PROCEDURE — 77295 3-D RADIOTHERAPY PLAN: CPT | Performed by: RADIOLOGY

## 2025-03-28 PROCEDURE — 77334 RADIATION TREATMENT AID(S): CPT | Performed by: RADIOLOGY

## 2025-03-28 PROCEDURE — 77300 RADIATION THERAPY DOSE PLAN: CPT | Performed by: RADIOLOGY

## 2025-04-01 ENCOUNTER — HOSPITAL ENCOUNTER (OUTPATIENT)
Facility: HOSPITAL | Age: 35
Setting detail: RADIATION/ONCOLOGY SERIES
End: 2025-04-01
Payer: MEDICAID

## 2025-04-02 ENCOUNTER — HOSPITAL ENCOUNTER (OUTPATIENT)
Facility: HOSPITAL | Age: 35
Setting detail: RADIATION/ONCOLOGY SERIES
Discharge: HOME | End: 2025-04-02
Payer: MEDICAID

## 2025-04-02 PROCEDURE — 77280 THER RAD SIMULAJ FIELD SMPL: CPT | Performed by: RADIOLOGY

## 2025-04-03 ENCOUNTER — APPOINTMENT (OUTPATIENT)
Facility: HOSPITAL | Age: 35
End: 2025-04-03
Payer: MEDICAID

## 2025-04-04 ENCOUNTER — APPOINTMENT (OUTPATIENT)
Facility: HOSPITAL | Age: 35
End: 2025-04-04
Payer: MEDICAID

## 2025-04-07 ENCOUNTER — HOSPITAL ENCOUNTER (OUTPATIENT)
Facility: HOSPITAL | Age: 35
Setting detail: RADIATION/ONCOLOGY SERIES
Discharge: HOME | End: 2025-04-07
Payer: MEDICAID

## 2025-04-07 LAB
RAD ONC ARIA COURSE ID: 1
RAD ONC ARIA COURSE INTENT: NORMAL
RAD ONC ARIA COURSE LAST TREATMENT DATE: NORMAL
RAD ONC ARIA COURSE START DATE: NORMAL
RAD ONC ARIA COURSE TREATMENT ELAPSED DAYS: 0
RAD ONC ARIA FIRST TREATMENT DATE: NORMAL
RAD ONC ARIA PLAN FRACTIONS TREATED TO DATE: 1
RAD ONC ARIA PLAN FRACTIONS TREATED TO DATE: 1
RAD ONC ARIA PLAN ID: NORMAL
RAD ONC ARIA PLAN ID: NORMAL
RAD ONC ARIA PLAN NAME: NORMAL
RAD ONC ARIA PLAN NAME: NORMAL
RAD ONC ARIA PLAN PRESCRIBED DOSE PER FRACTION: 2 GY
RAD ONC ARIA PLAN PRESCRIBED DOSE PER FRACTION: 2 GY
RAD ONC ARIA PLAN PRIMARY REFERENCE POINT: NORMAL
RAD ONC ARIA PLAN PRIMARY REFERENCE POINT: NORMAL
RAD ONC ARIA PLAN TOTAL FRACTIONS PRESCRIBED: 25
RAD ONC ARIA PLAN TOTAL FRACTIONS PRESCRIBED: 25
RAD ONC ARIA PLAN TOTAL PRESCRIBED DOSE: 5000 CGY
RAD ONC ARIA PLAN TOTAL PRESCRIBED DOSE: 5000 CGY
RAD ONC ARIA REFERENCE POINT DOSAGE GIVEN TO DATE: 2 GY
RAD ONC ARIA REFERENCE POINT DOSAGE GIVEN TO DATE: 2 GY
RAD ONC ARIA REFERENCE POINT ID: NORMAL
RAD ONC ARIA REFERENCE POINT ID: NORMAL
RAD ONC ARIA REFERENCE POINT SESSION DOSAGE GIVEN: 2 GY
RAD ONC ARIA REFERENCE POINT SESSION DOSAGE GIVEN: 2 GY

## 2025-04-07 PROCEDURE — 77387 GUIDANCE FOR RADJ TX DLVR: CPT | Performed by: RADIOLOGY

## 2025-04-07 PROCEDURE — 77412 RADIATION TX DELIVERY LVL 3: CPT | Performed by: RADIOLOGY

## 2025-04-08 ENCOUNTER — DOCUMENTATION (OUTPATIENT)
Dept: NUTRITION | Facility: HOSPITAL | Age: 35
End: 2025-04-08
Payer: MEDICAID

## 2025-04-08 ENCOUNTER — HOSPITAL ENCOUNTER (OUTPATIENT)
Facility: HOSPITAL | Age: 35
Setting detail: RADIATION/ONCOLOGY SERIES
Discharge: HOME | End: 2025-04-08
Payer: MEDICAID

## 2025-04-08 VITALS — BODY MASS INDEX: 33.99 KG/M2 | WEIGHT: 217 LBS

## 2025-04-08 LAB
RAD ONC ARIA COURSE ID: 1
RAD ONC ARIA COURSE INTENT: NORMAL
RAD ONC ARIA COURSE LAST TREATMENT DATE: NORMAL
RAD ONC ARIA COURSE START DATE: NORMAL
RAD ONC ARIA COURSE TREATMENT ELAPSED DAYS: 1
RAD ONC ARIA FIRST TREATMENT DATE: NORMAL
RAD ONC ARIA PLAN FRACTIONS TREATED TO DATE: 2
RAD ONC ARIA PLAN FRACTIONS TREATED TO DATE: 2
RAD ONC ARIA PLAN ID: NORMAL
RAD ONC ARIA PLAN ID: NORMAL
RAD ONC ARIA PLAN NAME: NORMAL
RAD ONC ARIA PLAN NAME: NORMAL
RAD ONC ARIA PLAN PRESCRIBED DOSE PER FRACTION: 2 GY
RAD ONC ARIA PLAN PRESCRIBED DOSE PER FRACTION: 2 GY
RAD ONC ARIA PLAN PRIMARY REFERENCE POINT: NORMAL
RAD ONC ARIA PLAN PRIMARY REFERENCE POINT: NORMAL
RAD ONC ARIA PLAN TOTAL FRACTIONS PRESCRIBED: 25
RAD ONC ARIA PLAN TOTAL FRACTIONS PRESCRIBED: 25
RAD ONC ARIA PLAN TOTAL PRESCRIBED DOSE: 5000 CGY
RAD ONC ARIA PLAN TOTAL PRESCRIBED DOSE: 5000 CGY
RAD ONC ARIA REFERENCE POINT DOSAGE GIVEN TO DATE: 4 GY
RAD ONC ARIA REFERENCE POINT DOSAGE GIVEN TO DATE: 4 GY
RAD ONC ARIA REFERENCE POINT ID: NORMAL
RAD ONC ARIA REFERENCE POINT ID: NORMAL
RAD ONC ARIA REFERENCE POINT SESSION DOSAGE GIVEN: 2 GY
RAD ONC ARIA REFERENCE POINT SESSION DOSAGE GIVEN: 2 GY

## 2025-04-08 PROCEDURE — 77412 RADIATION TX DELIVERY LVL 3: CPT | Performed by: RADIOLOGY

## 2025-04-08 PROCEDURE — 77387 GUIDANCE FOR RADJ TX DLVR: CPT | Performed by: RADIOLOGY

## 2025-04-08 NOTE — PROGRESS NOTES
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      Briefly saw patient after her radiation treatment today. Reports she has no dietary needs at this time.     All questions/concerns addressed at this time.     Will follow as indicated.     Mariah Marsh, CRISTY, LD

## 2025-04-09 ENCOUNTER — HOSPITAL ENCOUNTER (OUTPATIENT)
Facility: HOSPITAL | Age: 35
Setting detail: RADIATION/ONCOLOGY SERIES
Discharge: HOME | End: 2025-04-09
Payer: MEDICAID

## 2025-04-09 LAB
RAD ONC ARIA COURSE ID: 1
RAD ONC ARIA COURSE INTENT: NORMAL
RAD ONC ARIA COURSE LAST TREATMENT DATE: NORMAL
RAD ONC ARIA COURSE START DATE: NORMAL
RAD ONC ARIA COURSE TREATMENT ELAPSED DAYS: 2
RAD ONC ARIA FIRST TREATMENT DATE: NORMAL
RAD ONC ARIA PLAN FRACTIONS TREATED TO DATE: 3
RAD ONC ARIA PLAN FRACTIONS TREATED TO DATE: 3
RAD ONC ARIA PLAN ID: NORMAL
RAD ONC ARIA PLAN ID: NORMAL
RAD ONC ARIA PLAN NAME: NORMAL
RAD ONC ARIA PLAN NAME: NORMAL
RAD ONC ARIA PLAN PRESCRIBED DOSE PER FRACTION: 2 GY
RAD ONC ARIA PLAN PRESCRIBED DOSE PER FRACTION: 2 GY
RAD ONC ARIA PLAN PRIMARY REFERENCE POINT: NORMAL
RAD ONC ARIA PLAN PRIMARY REFERENCE POINT: NORMAL
RAD ONC ARIA PLAN TOTAL FRACTIONS PRESCRIBED: 25
RAD ONC ARIA PLAN TOTAL FRACTIONS PRESCRIBED: 25
RAD ONC ARIA PLAN TOTAL PRESCRIBED DOSE: 5000 CGY
RAD ONC ARIA PLAN TOTAL PRESCRIBED DOSE: 5000 CGY
RAD ONC ARIA REFERENCE POINT DOSAGE GIVEN TO DATE: 6 GY
RAD ONC ARIA REFERENCE POINT DOSAGE GIVEN TO DATE: 6 GY
RAD ONC ARIA REFERENCE POINT ID: NORMAL
RAD ONC ARIA REFERENCE POINT ID: NORMAL
RAD ONC ARIA REFERENCE POINT SESSION DOSAGE GIVEN: 2 GY
RAD ONC ARIA REFERENCE POINT SESSION DOSAGE GIVEN: 2 GY

## 2025-04-09 PROCEDURE — 77412 RADIATION TX DELIVERY LVL 3: CPT | Performed by: RADIOLOGY

## 2025-04-09 PROCEDURE — 77387 GUIDANCE FOR RADJ TX DLVR: CPT | Performed by: RADIOLOGY

## 2025-04-10 ENCOUNTER — HOSPITAL ENCOUNTER (OUTPATIENT)
Facility: HOSPITAL | Age: 35
Setting detail: RADIATION/ONCOLOGY SERIES
Discharge: HOME | End: 2025-04-10
Payer: MEDICAID

## 2025-04-10 LAB
RAD ONC ARIA COURSE ID: 1
RAD ONC ARIA COURSE INTENT: NORMAL
RAD ONC ARIA COURSE LAST TREATMENT DATE: NORMAL
RAD ONC ARIA COURSE START DATE: NORMAL
RAD ONC ARIA COURSE TREATMENT ELAPSED DAYS: 3
RAD ONC ARIA FIRST TREATMENT DATE: NORMAL
RAD ONC ARIA PLAN FRACTIONS TREATED TO DATE: 4
RAD ONC ARIA PLAN FRACTIONS TREATED TO DATE: 4
RAD ONC ARIA PLAN ID: NORMAL
RAD ONC ARIA PLAN ID: NORMAL
RAD ONC ARIA PLAN NAME: NORMAL
RAD ONC ARIA PLAN NAME: NORMAL
RAD ONC ARIA PLAN PRESCRIBED DOSE PER FRACTION: 2 GY
RAD ONC ARIA PLAN PRESCRIBED DOSE PER FRACTION: 2 GY
RAD ONC ARIA PLAN PRIMARY REFERENCE POINT: NORMAL
RAD ONC ARIA PLAN PRIMARY REFERENCE POINT: NORMAL
RAD ONC ARIA PLAN TOTAL FRACTIONS PRESCRIBED: 25
RAD ONC ARIA PLAN TOTAL FRACTIONS PRESCRIBED: 25
RAD ONC ARIA PLAN TOTAL PRESCRIBED DOSE: 5000 CGY
RAD ONC ARIA PLAN TOTAL PRESCRIBED DOSE: 5000 CGY
RAD ONC ARIA REFERENCE POINT DOSAGE GIVEN TO DATE: 8 GY
RAD ONC ARIA REFERENCE POINT DOSAGE GIVEN TO DATE: 8 GY
RAD ONC ARIA REFERENCE POINT ID: NORMAL
RAD ONC ARIA REFERENCE POINT ID: NORMAL
RAD ONC ARIA REFERENCE POINT SESSION DOSAGE GIVEN: 2 GY
RAD ONC ARIA REFERENCE POINT SESSION DOSAGE GIVEN: 2 GY

## 2025-04-10 PROCEDURE — 77412 RADIATION TX DELIVERY LVL 3: CPT | Performed by: RADIOLOGY

## 2025-04-10 PROCEDURE — 77387 GUIDANCE FOR RADJ TX DLVR: CPT | Performed by: RADIOLOGY

## 2025-04-11 ENCOUNTER — HOSPITAL ENCOUNTER (OUTPATIENT)
Facility: HOSPITAL | Age: 35
Setting detail: RADIATION/ONCOLOGY SERIES
Discharge: HOME | End: 2025-04-11
Payer: MEDICAID

## 2025-04-11 ENCOUNTER — TELEPHONE (OUTPATIENT)
Dept: PHYSICAL THERAPY | Facility: HOSPITAL | Age: 35
End: 2025-04-11
Payer: MEDICAID

## 2025-04-11 LAB
RAD ONC ARIA COURSE ID: 1
RAD ONC ARIA COURSE INTENT: NORMAL
RAD ONC ARIA COURSE LAST TREATMENT DATE: NORMAL
RAD ONC ARIA COURSE START DATE: NORMAL
RAD ONC ARIA COURSE TREATMENT ELAPSED DAYS: 4
RAD ONC ARIA FIRST TREATMENT DATE: NORMAL
RAD ONC ARIA PLAN FRACTIONS TREATED TO DATE: 5
RAD ONC ARIA PLAN FRACTIONS TREATED TO DATE: 5
RAD ONC ARIA PLAN ID: NORMAL
RAD ONC ARIA PLAN ID: NORMAL
RAD ONC ARIA PLAN NAME: NORMAL
RAD ONC ARIA PLAN NAME: NORMAL
RAD ONC ARIA PLAN PRESCRIBED DOSE PER FRACTION: 2 GY
RAD ONC ARIA PLAN PRESCRIBED DOSE PER FRACTION: 2 GY
RAD ONC ARIA PLAN PRIMARY REFERENCE POINT: NORMAL
RAD ONC ARIA PLAN PRIMARY REFERENCE POINT: NORMAL
RAD ONC ARIA PLAN TOTAL FRACTIONS PRESCRIBED: 25
RAD ONC ARIA PLAN TOTAL FRACTIONS PRESCRIBED: 25
RAD ONC ARIA PLAN TOTAL PRESCRIBED DOSE: 5000 CGY
RAD ONC ARIA PLAN TOTAL PRESCRIBED DOSE: 5000 CGY
RAD ONC ARIA REFERENCE POINT DOSAGE GIVEN TO DATE: 10 GY
RAD ONC ARIA REFERENCE POINT DOSAGE GIVEN TO DATE: 10 GY
RAD ONC ARIA REFERENCE POINT ID: NORMAL
RAD ONC ARIA REFERENCE POINT ID: NORMAL
RAD ONC ARIA REFERENCE POINT SESSION DOSAGE GIVEN: 2 GY
RAD ONC ARIA REFERENCE POINT SESSION DOSAGE GIVEN: 2 GY

## 2025-04-11 PROCEDURE — 77412 RADIATION TX DELIVERY LVL 3: CPT | Performed by: RADIOLOGY

## 2025-04-11 PROCEDURE — 77387 GUIDANCE FOR RADJ TX DLVR: CPT | Performed by: RADIOLOGY

## 2025-04-11 PROCEDURE — 77336 RADIATION PHYSICS CONSULT: CPT | Performed by: RADIOLOGY

## 2025-04-11 NOTE — TELEPHONE ENCOUNTER
PT has tried numerous DMEs to order compression bra options. However, most are not billing pt's insurance. PT will continue to try the remaining DMEs frequently used at this location. Called to advise pt and encourage pt to call PT if she has any difficulty. Advised that some DMEs may reach out with out-of-pocket costs to order. She is not required to pay these expenses and can decline purchasing without insurance assistance. PT to call pt back when     - Bernice Marshall, PT

## 2025-04-14 ENCOUNTER — HOSPITAL ENCOUNTER (OUTPATIENT)
Facility: HOSPITAL | Age: 35
Setting detail: RADIATION/ONCOLOGY SERIES
Discharge: HOME | End: 2025-04-14
Payer: MEDICAID

## 2025-04-14 LAB
RAD ONC ARIA COURSE ID: 1
RAD ONC ARIA COURSE INTENT: NORMAL
RAD ONC ARIA COURSE LAST TREATMENT DATE: NORMAL
RAD ONC ARIA COURSE START DATE: NORMAL
RAD ONC ARIA COURSE TREATMENT ELAPSED DAYS: 7
RAD ONC ARIA FIRST TREATMENT DATE: NORMAL
RAD ONC ARIA PLAN FRACTIONS TREATED TO DATE: 6
RAD ONC ARIA PLAN FRACTIONS TREATED TO DATE: 6
RAD ONC ARIA PLAN ID: NORMAL
RAD ONC ARIA PLAN ID: NORMAL
RAD ONC ARIA PLAN NAME: NORMAL
RAD ONC ARIA PLAN NAME: NORMAL
RAD ONC ARIA PLAN PRESCRIBED DOSE PER FRACTION: 2 GY
RAD ONC ARIA PLAN PRESCRIBED DOSE PER FRACTION: 2 GY
RAD ONC ARIA PLAN PRIMARY REFERENCE POINT: NORMAL
RAD ONC ARIA PLAN PRIMARY REFERENCE POINT: NORMAL
RAD ONC ARIA PLAN TOTAL FRACTIONS PRESCRIBED: 25
RAD ONC ARIA PLAN TOTAL FRACTIONS PRESCRIBED: 25
RAD ONC ARIA PLAN TOTAL PRESCRIBED DOSE: 5000 CGY
RAD ONC ARIA PLAN TOTAL PRESCRIBED DOSE: 5000 CGY
RAD ONC ARIA REFERENCE POINT DOSAGE GIVEN TO DATE: 12 GY
RAD ONC ARIA REFERENCE POINT DOSAGE GIVEN TO DATE: 12 GY
RAD ONC ARIA REFERENCE POINT ID: NORMAL
RAD ONC ARIA REFERENCE POINT ID: NORMAL
RAD ONC ARIA REFERENCE POINT SESSION DOSAGE GIVEN: 2 GY
RAD ONC ARIA REFERENCE POINT SESSION DOSAGE GIVEN: 2 GY

## 2025-04-14 PROCEDURE — 77387 GUIDANCE FOR RADJ TX DLVR: CPT | Performed by: RADIOLOGY

## 2025-04-14 PROCEDURE — 77412 RADIATION TX DELIVERY LVL 3: CPT | Performed by: RADIOLOGY

## 2025-04-15 ENCOUNTER — DOCUMENTATION (OUTPATIENT)
Age: 35
End: 2025-04-15
Payer: MEDICAID

## 2025-04-15 ENCOUNTER — HOSPITAL ENCOUNTER (OUTPATIENT)
Facility: HOSPITAL | Age: 35
Discharge: HOME | End: 2025-04-15

## 2025-04-15 ENCOUNTER — HOSPITAL ENCOUNTER (OUTPATIENT)
Facility: HOSPITAL | Age: 35
Setting detail: RADIATION/ONCOLOGY SERIES
Discharge: HOME | End: 2025-04-15
Payer: MEDICAID

## 2025-04-15 VITALS — BODY MASS INDEX: 34 KG/M2 | WEIGHT: 217.1 LBS

## 2025-04-15 LAB
RAD ONC ARIA COURSE ID: 1
RAD ONC ARIA COURSE INTENT: NORMAL
RAD ONC ARIA COURSE LAST TREATMENT DATE: NORMAL
RAD ONC ARIA COURSE START DATE: NORMAL
RAD ONC ARIA COURSE TREATMENT ELAPSED DAYS: 8
RAD ONC ARIA FIRST TREATMENT DATE: NORMAL
RAD ONC ARIA PLAN FRACTIONS TREATED TO DATE: 7
RAD ONC ARIA PLAN FRACTIONS TREATED TO DATE: 7
RAD ONC ARIA PLAN ID: NORMAL
RAD ONC ARIA PLAN ID: NORMAL
RAD ONC ARIA PLAN NAME: NORMAL
RAD ONC ARIA PLAN NAME: NORMAL
RAD ONC ARIA PLAN PRESCRIBED DOSE PER FRACTION: 2 GY
RAD ONC ARIA PLAN PRESCRIBED DOSE PER FRACTION: 2 GY
RAD ONC ARIA PLAN PRIMARY REFERENCE POINT: NORMAL
RAD ONC ARIA PLAN PRIMARY REFERENCE POINT: NORMAL
RAD ONC ARIA PLAN TOTAL FRACTIONS PRESCRIBED: 25
RAD ONC ARIA PLAN TOTAL FRACTIONS PRESCRIBED: 25
RAD ONC ARIA PLAN TOTAL PRESCRIBED DOSE: 5000 CGY
RAD ONC ARIA PLAN TOTAL PRESCRIBED DOSE: 5000 CGY
RAD ONC ARIA REFERENCE POINT DOSAGE GIVEN TO DATE: 14 GY
RAD ONC ARIA REFERENCE POINT DOSAGE GIVEN TO DATE: 14 GY
RAD ONC ARIA REFERENCE POINT ID: NORMAL
RAD ONC ARIA REFERENCE POINT ID: NORMAL
RAD ONC ARIA REFERENCE POINT SESSION DOSAGE GIVEN: 2 GY
RAD ONC ARIA REFERENCE POINT SESSION DOSAGE GIVEN: 2 GY

## 2025-04-15 PROCEDURE — 77387 GUIDANCE FOR RADJ TX DLVR: CPT | Performed by: RADIOLOGY

## 2025-04-15 PROCEDURE — 77412 RADIATION TX DELIVERY LVL 3: CPT | Performed by: RADIOLOGY

## 2025-04-15 NOTE — PROGRESS NOTES
"She was feeling very frustrated about weight gain. She said she has gained 20# since she has bilateral mastectomies. Discussed with patient current activity and eating pattersn. She said that food in general is not very appealing to her and that she feels she is not overeating. She typically only eats 2 meals per day - breakfast and dinner with occasional snacks in between. Suggested trying to switch to more frequent meals throughout the day with emphasis on protein, the thought of eating that frequently (considering a poor appetite) was not appealing to her. She also said that Dr. Horvath is planning to start ovarian suppression in May. She does not physically exercise, but states she usually gets in about 1-1.5 miles of \"walking\" with just her daily activities. Patient is agreeable to discussing with dietitian and encouraged her to discuss with Dr. Horvath at her follow up in early May. Message sent to Mariah Marsh RD with update.   "

## 2025-04-16 ENCOUNTER — HOSPITAL ENCOUNTER (OUTPATIENT)
Facility: HOSPITAL | Age: 35
Setting detail: RADIATION/ONCOLOGY SERIES
Discharge: HOME | End: 2025-04-16
Payer: MEDICAID

## 2025-04-16 LAB
RAD ONC ARIA COURSE ID: 1
RAD ONC ARIA COURSE INTENT: NORMAL
RAD ONC ARIA COURSE LAST TREATMENT DATE: NORMAL
RAD ONC ARIA COURSE START DATE: NORMAL
RAD ONC ARIA COURSE TREATMENT ELAPSED DAYS: 9
RAD ONC ARIA FIRST TREATMENT DATE: NORMAL
RAD ONC ARIA PLAN FRACTIONS TREATED TO DATE: 8
RAD ONC ARIA PLAN FRACTIONS TREATED TO DATE: 8
RAD ONC ARIA PLAN ID: NORMAL
RAD ONC ARIA PLAN ID: NORMAL
RAD ONC ARIA PLAN NAME: NORMAL
RAD ONC ARIA PLAN NAME: NORMAL
RAD ONC ARIA PLAN PRESCRIBED DOSE PER FRACTION: 2 GY
RAD ONC ARIA PLAN PRESCRIBED DOSE PER FRACTION: 2 GY
RAD ONC ARIA PLAN PRIMARY REFERENCE POINT: NORMAL
RAD ONC ARIA PLAN PRIMARY REFERENCE POINT: NORMAL
RAD ONC ARIA PLAN TOTAL FRACTIONS PRESCRIBED: 25
RAD ONC ARIA PLAN TOTAL FRACTIONS PRESCRIBED: 25
RAD ONC ARIA PLAN TOTAL PRESCRIBED DOSE: 5000 CGY
RAD ONC ARIA PLAN TOTAL PRESCRIBED DOSE: 5000 CGY
RAD ONC ARIA REFERENCE POINT DOSAGE GIVEN TO DATE: 16 GY
RAD ONC ARIA REFERENCE POINT DOSAGE GIVEN TO DATE: 16 GY
RAD ONC ARIA REFERENCE POINT ID: NORMAL
RAD ONC ARIA REFERENCE POINT ID: NORMAL
RAD ONC ARIA REFERENCE POINT SESSION DOSAGE GIVEN: 2 GY
RAD ONC ARIA REFERENCE POINT SESSION DOSAGE GIVEN: 2 GY

## 2025-04-16 PROCEDURE — 77412 RADIATION TX DELIVERY LVL 3: CPT | Performed by: RADIOLOGY

## 2025-04-16 PROCEDURE — 77387 GUIDANCE FOR RADJ TX DLVR: CPT | Performed by: RADIOLOGY

## 2025-04-17 ENCOUNTER — HOSPITAL ENCOUNTER (OUTPATIENT)
Facility: HOSPITAL | Age: 35
Setting detail: RADIATION/ONCOLOGY SERIES
Discharge: HOME | End: 2025-04-17
Payer: MEDICAID

## 2025-04-17 ENCOUNTER — DOCUMENTATION (OUTPATIENT)
Dept: NUTRITION | Facility: HOSPITAL | Age: 35
End: 2025-04-17
Payer: MEDICAID

## 2025-04-17 LAB
RAD ONC ARIA COURSE ID: 1
RAD ONC ARIA COURSE INTENT: NORMAL
RAD ONC ARIA COURSE LAST TREATMENT DATE: NORMAL
RAD ONC ARIA COURSE START DATE: NORMAL
RAD ONC ARIA COURSE TREATMENT ELAPSED DAYS: 10
RAD ONC ARIA FIRST TREATMENT DATE: NORMAL
RAD ONC ARIA PLAN FRACTIONS TREATED TO DATE: 9
RAD ONC ARIA PLAN FRACTIONS TREATED TO DATE: 9
RAD ONC ARIA PLAN ID: NORMAL
RAD ONC ARIA PLAN ID: NORMAL
RAD ONC ARIA PLAN NAME: NORMAL
RAD ONC ARIA PLAN NAME: NORMAL
RAD ONC ARIA PLAN PRESCRIBED DOSE PER FRACTION: 2 GY
RAD ONC ARIA PLAN PRESCRIBED DOSE PER FRACTION: 2 GY
RAD ONC ARIA PLAN PRIMARY REFERENCE POINT: NORMAL
RAD ONC ARIA PLAN PRIMARY REFERENCE POINT: NORMAL
RAD ONC ARIA PLAN TOTAL FRACTIONS PRESCRIBED: 25
RAD ONC ARIA PLAN TOTAL FRACTIONS PRESCRIBED: 25
RAD ONC ARIA PLAN TOTAL PRESCRIBED DOSE: 5000 CGY
RAD ONC ARIA PLAN TOTAL PRESCRIBED DOSE: 5000 CGY
RAD ONC ARIA REFERENCE POINT DOSAGE GIVEN TO DATE: 18 GY
RAD ONC ARIA REFERENCE POINT DOSAGE GIVEN TO DATE: 18 GY
RAD ONC ARIA REFERENCE POINT ID: NORMAL
RAD ONC ARIA REFERENCE POINT ID: NORMAL
RAD ONC ARIA REFERENCE POINT SESSION DOSAGE GIVEN: 2 GY
RAD ONC ARIA REFERENCE POINT SESSION DOSAGE GIVEN: 2 GY

## 2025-04-17 PROCEDURE — 77387 GUIDANCE FOR RADJ TX DLVR: CPT | Performed by: RADIOLOGY

## 2025-04-17 PROCEDURE — 77412 RADIATION TX DELIVERY LVL 3: CPT | Performed by: RADIOLOGY

## 2025-04-17 NOTE — PROGRESS NOTES
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 GIBSON Harman reached out to RD and explained patient is very frustrated with a 20# weight gain since November. RD meet with patient today to discuss questions/concerns.    Patient reports since she's gone through chemo, her body was forced into menopause. She reports since November, there hasn't been changes to her diet/eating habits, but she isn't working full time anymore. Note, jeanine started taking wellbutrin in December.     RD calculated patient's BMR, with activity, her minimum daily needs are about 2,000 calories.     Patient reports she typically has 2 meals daily with rare snacking. RD asked for a 24 hours recall, patient said yesterday she had coffee, 1 dr.pepper in the morning, watermelon for lunch, around 60 ounces of water throughout the day and grilled brats, peppers and had macaroni salad for dinner last night. Patient reports she sometimes has herbal tea as well.     RD explained patient was not consuming enough protein/calories/fiber, especially since her body is in a state of menopause right now.     Reviewed the importance of good nutrition during her treatment course and after the completion of her treatment, focusing on adequate calorie, protein, nutrient and fluid intake. Advised her to be consuming smaller more frequent meals/snacks throughout the day. Emphasized the importance of protein and its role in the diet; reviewed high protein foods; and recommended she have a protein source at each meal/snack.     Patient asked about  protein drinks and other protein supplements. RD explained that both are good ways to help supplement protein intake but encouraged her to try to get a variety of sources through whole foods. RD provided examples like adding protein or collagen powder to oatmeal or drinking an ONS/protein shake with fruit in the morning.     Patient education material provided-  Meal Planning Guide  The 3 Macronutrients  High Fiber Snack List    All questions/concerns addressed at this time.     Will follow as indicated.     Mariah Marsh RD, LD

## 2025-04-18 ENCOUNTER — APPOINTMENT (OUTPATIENT)
Facility: HOSPITAL | Age: 35
End: 2025-04-18
Payer: MEDICAID

## 2025-04-18 PROCEDURE — 77387 GUIDANCE FOR RADJ TX DLVR: CPT | Performed by: RADIOLOGY

## 2025-04-18 PROCEDURE — 77336 RADIATION PHYSICS CONSULT: CPT | Performed by: RADIOLOGY

## 2025-04-18 PROCEDURE — 77412 RADIATION TX DELIVERY LVL 3: CPT | Performed by: RADIOLOGY

## 2025-04-20 LAB
RAD ONC ARIA COURSE ID: 1
RAD ONC ARIA COURSE INTENT: NORMAL
RAD ONC ARIA COURSE LAST TREATMENT DATE: NORMAL
RAD ONC ARIA COURSE START DATE: NORMAL
RAD ONC ARIA COURSE TREATMENT ELAPSED DAYS: 11
RAD ONC ARIA FIRST TREATMENT DATE: NORMAL
RAD ONC ARIA PLAN FRACTIONS TREATED TO DATE: 10
RAD ONC ARIA PLAN FRACTIONS TREATED TO DATE: 10
RAD ONC ARIA PLAN ID: NORMAL
RAD ONC ARIA PLAN ID: NORMAL
RAD ONC ARIA PLAN NAME: NORMAL
RAD ONC ARIA PLAN NAME: NORMAL
RAD ONC ARIA PLAN PRESCRIBED DOSE PER FRACTION: 2 GY
RAD ONC ARIA PLAN PRESCRIBED DOSE PER FRACTION: 2 GY
RAD ONC ARIA PLAN PRIMARY REFERENCE POINT: NORMAL
RAD ONC ARIA PLAN PRIMARY REFERENCE POINT: NORMAL
RAD ONC ARIA PLAN TOTAL FRACTIONS PRESCRIBED: 25
RAD ONC ARIA PLAN TOTAL FRACTIONS PRESCRIBED: 25
RAD ONC ARIA PLAN TOTAL PRESCRIBED DOSE: 5000 CGY
RAD ONC ARIA PLAN TOTAL PRESCRIBED DOSE: 5000 CGY
RAD ONC ARIA REFERENCE POINT DOSAGE GIVEN TO DATE: 20 GY
RAD ONC ARIA REFERENCE POINT DOSAGE GIVEN TO DATE: 20 GY
RAD ONC ARIA REFERENCE POINT ID: NORMAL
RAD ONC ARIA REFERENCE POINT ID: NORMAL
RAD ONC ARIA REFERENCE POINT SESSION DOSAGE GIVEN: 2 GY
RAD ONC ARIA REFERENCE POINT SESSION DOSAGE GIVEN: 2 GY

## 2025-04-21 ENCOUNTER — HOSPITAL ENCOUNTER (OUTPATIENT)
Facility: HOSPITAL | Age: 35
Setting detail: RADIATION/ONCOLOGY SERIES
Discharge: HOME | End: 2025-04-21
Payer: MEDICAID

## 2025-04-21 LAB
RAD ONC ARIA COURSE ID: 1
RAD ONC ARIA COURSE INTENT: NORMAL
RAD ONC ARIA COURSE LAST TREATMENT DATE: NORMAL
RAD ONC ARIA COURSE START DATE: NORMAL
RAD ONC ARIA COURSE TREATMENT ELAPSED DAYS: 14
RAD ONC ARIA FIRST TREATMENT DATE: NORMAL
RAD ONC ARIA PLAN FRACTIONS TREATED TO DATE: 11
RAD ONC ARIA PLAN FRACTIONS TREATED TO DATE: 11
RAD ONC ARIA PLAN ID: NORMAL
RAD ONC ARIA PLAN ID: NORMAL
RAD ONC ARIA PLAN NAME: NORMAL
RAD ONC ARIA PLAN NAME: NORMAL
RAD ONC ARIA PLAN PRESCRIBED DOSE PER FRACTION: 2 GY
RAD ONC ARIA PLAN PRESCRIBED DOSE PER FRACTION: 2 GY
RAD ONC ARIA PLAN PRIMARY REFERENCE POINT: NORMAL
RAD ONC ARIA PLAN PRIMARY REFERENCE POINT: NORMAL
RAD ONC ARIA PLAN TOTAL FRACTIONS PRESCRIBED: 25
RAD ONC ARIA PLAN TOTAL FRACTIONS PRESCRIBED: 25
RAD ONC ARIA PLAN TOTAL PRESCRIBED DOSE: 5000 CGY
RAD ONC ARIA PLAN TOTAL PRESCRIBED DOSE: 5000 CGY
RAD ONC ARIA REFERENCE POINT DOSAGE GIVEN TO DATE: 22 GY
RAD ONC ARIA REFERENCE POINT DOSAGE GIVEN TO DATE: 22 GY
RAD ONC ARIA REFERENCE POINT ID: NORMAL
RAD ONC ARIA REFERENCE POINT ID: NORMAL
RAD ONC ARIA REFERENCE POINT SESSION DOSAGE GIVEN: 2 GY
RAD ONC ARIA REFERENCE POINT SESSION DOSAGE GIVEN: 2 GY

## 2025-04-21 PROCEDURE — 77387 GUIDANCE FOR RADJ TX DLVR: CPT | Performed by: RADIOLOGY

## 2025-04-21 PROCEDURE — 77412 RADIATION TX DELIVERY LVL 3: CPT | Performed by: RADIOLOGY

## 2025-04-22 ENCOUNTER — DOCUMENTATION (OUTPATIENT)
Dept: NUTRITION | Facility: HOSPITAL | Age: 35
End: 2025-04-22
Payer: MEDICAID

## 2025-04-22 ENCOUNTER — HOSPITAL ENCOUNTER (OUTPATIENT)
Facility: HOSPITAL | Age: 35
Setting detail: RADIATION/ONCOLOGY SERIES
Discharge: HOME | End: 2025-04-22
Payer: MEDICAID

## 2025-04-22 ENCOUNTER — HOSPITAL ENCOUNTER (OUTPATIENT)
Facility: HOSPITAL | Age: 35
Discharge: HOME | End: 2025-04-22

## 2025-04-22 VITALS — WEIGHT: 215 LBS | BODY MASS INDEX: 33.67 KG/M2

## 2025-04-22 LAB
RAD ONC ARIA COURSE ID: 1
RAD ONC ARIA COURSE INTENT: NORMAL
RAD ONC ARIA COURSE LAST TREATMENT DATE: NORMAL
RAD ONC ARIA COURSE START DATE: NORMAL
RAD ONC ARIA COURSE TREATMENT ELAPSED DAYS: 15
RAD ONC ARIA FIRST TREATMENT DATE: NORMAL
RAD ONC ARIA PLAN FRACTIONS TREATED TO DATE: 12
RAD ONC ARIA PLAN FRACTIONS TREATED TO DATE: 12
RAD ONC ARIA PLAN ID: NORMAL
RAD ONC ARIA PLAN ID: NORMAL
RAD ONC ARIA PLAN NAME: NORMAL
RAD ONC ARIA PLAN NAME: NORMAL
RAD ONC ARIA PLAN PRESCRIBED DOSE PER FRACTION: 2 GY
RAD ONC ARIA PLAN PRESCRIBED DOSE PER FRACTION: 2 GY
RAD ONC ARIA PLAN PRIMARY REFERENCE POINT: NORMAL
RAD ONC ARIA PLAN PRIMARY REFERENCE POINT: NORMAL
RAD ONC ARIA PLAN TOTAL FRACTIONS PRESCRIBED: 25
RAD ONC ARIA PLAN TOTAL FRACTIONS PRESCRIBED: 25
RAD ONC ARIA PLAN TOTAL PRESCRIBED DOSE: 5000 CGY
RAD ONC ARIA PLAN TOTAL PRESCRIBED DOSE: 5000 CGY
RAD ONC ARIA REFERENCE POINT DOSAGE GIVEN TO DATE: 24 GY
RAD ONC ARIA REFERENCE POINT DOSAGE GIVEN TO DATE: 24 GY
RAD ONC ARIA REFERENCE POINT ID: NORMAL
RAD ONC ARIA REFERENCE POINT ID: NORMAL
RAD ONC ARIA REFERENCE POINT SESSION DOSAGE GIVEN: 2 GY
RAD ONC ARIA REFERENCE POINT SESSION DOSAGE GIVEN: 2 GY

## 2025-04-22 PROCEDURE — 77412 RADIATION TX DELIVERY LVL 3: CPT | Performed by: RADIOLOGY

## 2025-04-22 PROCEDURE — 77387 GUIDANCE FOR RADJ TX DLVR: CPT | Performed by: RADIOLOGY

## 2025-04-22 NOTE — PROGRESS NOTES
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       Patient reports the past few days have gone well. Patient downloaded an lakisha to help calculate how many calories she's consuming each day. She said she has increased her daily intake and said yesterday she consumed about 1,800 calories.     Patient reports some fatigue but no other nutrition impact symptoms at this time.      All questions/concerns addressed at this time.      Will follow as indicated.      Mariah Marsh RD, LD

## 2025-04-23 ENCOUNTER — HOSPITAL ENCOUNTER (OUTPATIENT)
Facility: HOSPITAL | Age: 35
Setting detail: RADIATION/ONCOLOGY SERIES
Discharge: HOME | End: 2025-04-23
Payer: MEDICAID

## 2025-04-23 LAB
RAD ONC ARIA COURSE ID: 1
RAD ONC ARIA COURSE INTENT: NORMAL
RAD ONC ARIA COURSE LAST TREATMENT DATE: NORMAL
RAD ONC ARIA COURSE START DATE: NORMAL
RAD ONC ARIA COURSE TREATMENT ELAPSED DAYS: 16
RAD ONC ARIA FIRST TREATMENT DATE: NORMAL
RAD ONC ARIA PLAN FRACTIONS TREATED TO DATE: 13
RAD ONC ARIA PLAN FRACTIONS TREATED TO DATE: 13
RAD ONC ARIA PLAN ID: NORMAL
RAD ONC ARIA PLAN ID: NORMAL
RAD ONC ARIA PLAN NAME: NORMAL
RAD ONC ARIA PLAN NAME: NORMAL
RAD ONC ARIA PLAN PRESCRIBED DOSE PER FRACTION: 2 GY
RAD ONC ARIA PLAN PRESCRIBED DOSE PER FRACTION: 2 GY
RAD ONC ARIA PLAN PRIMARY REFERENCE POINT: NORMAL
RAD ONC ARIA PLAN PRIMARY REFERENCE POINT: NORMAL
RAD ONC ARIA PLAN TOTAL FRACTIONS PRESCRIBED: 25
RAD ONC ARIA PLAN TOTAL FRACTIONS PRESCRIBED: 25
RAD ONC ARIA PLAN TOTAL PRESCRIBED DOSE: 5000 CGY
RAD ONC ARIA PLAN TOTAL PRESCRIBED DOSE: 5000 CGY
RAD ONC ARIA REFERENCE POINT DOSAGE GIVEN TO DATE: 26 GY
RAD ONC ARIA REFERENCE POINT DOSAGE GIVEN TO DATE: 26 GY
RAD ONC ARIA REFERENCE POINT ID: NORMAL
RAD ONC ARIA REFERENCE POINT ID: NORMAL
RAD ONC ARIA REFERENCE POINT SESSION DOSAGE GIVEN: 2 GY
RAD ONC ARIA REFERENCE POINT SESSION DOSAGE GIVEN: 2 GY

## 2025-04-23 PROCEDURE — 77412 RADIATION TX DELIVERY LVL 3: CPT | Performed by: RADIOLOGY

## 2025-04-23 PROCEDURE — 77387 GUIDANCE FOR RADJ TX DLVR: CPT | Performed by: RADIOLOGY

## 2025-04-24 ENCOUNTER — HOSPITAL ENCOUNTER (OUTPATIENT)
Facility: HOSPITAL | Age: 35
Setting detail: RADIATION/ONCOLOGY SERIES
Discharge: HOME | End: 2025-04-24
Payer: MEDICAID

## 2025-04-24 LAB
RAD ONC ARIA COURSE ID: 1
RAD ONC ARIA COURSE INTENT: NORMAL
RAD ONC ARIA COURSE LAST TREATMENT DATE: NORMAL
RAD ONC ARIA COURSE START DATE: NORMAL
RAD ONC ARIA COURSE TREATMENT ELAPSED DAYS: 17
RAD ONC ARIA FIRST TREATMENT DATE: NORMAL
RAD ONC ARIA PLAN FRACTIONS TREATED TO DATE: 14
RAD ONC ARIA PLAN FRACTIONS TREATED TO DATE: 14
RAD ONC ARIA PLAN ID: NORMAL
RAD ONC ARIA PLAN ID: NORMAL
RAD ONC ARIA PLAN NAME: NORMAL
RAD ONC ARIA PLAN NAME: NORMAL
RAD ONC ARIA PLAN PRESCRIBED DOSE PER FRACTION: 2 GY
RAD ONC ARIA PLAN PRESCRIBED DOSE PER FRACTION: 2 GY
RAD ONC ARIA PLAN PRIMARY REFERENCE POINT: NORMAL
RAD ONC ARIA PLAN PRIMARY REFERENCE POINT: NORMAL
RAD ONC ARIA PLAN TOTAL FRACTIONS PRESCRIBED: 25
RAD ONC ARIA PLAN TOTAL FRACTIONS PRESCRIBED: 25
RAD ONC ARIA PLAN TOTAL PRESCRIBED DOSE: 5000 CGY
RAD ONC ARIA PLAN TOTAL PRESCRIBED DOSE: 5000 CGY
RAD ONC ARIA REFERENCE POINT DOSAGE GIVEN TO DATE: 28 GY
RAD ONC ARIA REFERENCE POINT DOSAGE GIVEN TO DATE: 28 GY
RAD ONC ARIA REFERENCE POINT ID: NORMAL
RAD ONC ARIA REFERENCE POINT ID: NORMAL
RAD ONC ARIA REFERENCE POINT SESSION DOSAGE GIVEN: 2 GY
RAD ONC ARIA REFERENCE POINT SESSION DOSAGE GIVEN: 2 GY

## 2025-04-24 PROCEDURE — 77387 GUIDANCE FOR RADJ TX DLVR: CPT | Performed by: RADIOLOGY

## 2025-04-24 PROCEDURE — 77412 RADIATION TX DELIVERY LVL 3: CPT | Performed by: RADIOLOGY

## 2025-04-25 ENCOUNTER — HOSPITAL ENCOUNTER (OUTPATIENT)
Facility: HOSPITAL | Age: 35
Setting detail: RADIATION/ONCOLOGY SERIES
Discharge: HOME | End: 2025-04-25
Payer: MEDICAID

## 2025-04-25 LAB
RAD ONC ARIA COURSE ID: 1
RAD ONC ARIA COURSE INTENT: NORMAL
RAD ONC ARIA COURSE LAST TREATMENT DATE: NORMAL
RAD ONC ARIA COURSE START DATE: NORMAL
RAD ONC ARIA COURSE TREATMENT ELAPSED DAYS: 18
RAD ONC ARIA FIRST TREATMENT DATE: NORMAL
RAD ONC ARIA PLAN FRACTIONS TREATED TO DATE: 15
RAD ONC ARIA PLAN FRACTIONS TREATED TO DATE: 15
RAD ONC ARIA PLAN ID: NORMAL
RAD ONC ARIA PLAN ID: NORMAL
RAD ONC ARIA PLAN NAME: NORMAL
RAD ONC ARIA PLAN NAME: NORMAL
RAD ONC ARIA PLAN PRESCRIBED DOSE PER FRACTION: 2 GY
RAD ONC ARIA PLAN PRESCRIBED DOSE PER FRACTION: 2 GY
RAD ONC ARIA PLAN PRIMARY REFERENCE POINT: NORMAL
RAD ONC ARIA PLAN PRIMARY REFERENCE POINT: NORMAL
RAD ONC ARIA PLAN TOTAL FRACTIONS PRESCRIBED: 25
RAD ONC ARIA PLAN TOTAL FRACTIONS PRESCRIBED: 25
RAD ONC ARIA PLAN TOTAL PRESCRIBED DOSE: 5000 CGY
RAD ONC ARIA PLAN TOTAL PRESCRIBED DOSE: 5000 CGY
RAD ONC ARIA REFERENCE POINT DOSAGE GIVEN TO DATE: 30 GY
RAD ONC ARIA REFERENCE POINT DOSAGE GIVEN TO DATE: 30 GY
RAD ONC ARIA REFERENCE POINT ID: NORMAL
RAD ONC ARIA REFERENCE POINT ID: NORMAL
RAD ONC ARIA REFERENCE POINT SESSION DOSAGE GIVEN: 2 GY
RAD ONC ARIA REFERENCE POINT SESSION DOSAGE GIVEN: 2 GY

## 2025-04-25 PROCEDURE — 77387 GUIDANCE FOR RADJ TX DLVR: CPT | Performed by: RADIOLOGY

## 2025-04-25 PROCEDURE — 77336 RADIATION PHYSICS CONSULT: CPT | Performed by: RADIOLOGY

## 2025-04-25 PROCEDURE — 77412 RADIATION TX DELIVERY LVL 3: CPT | Performed by: RADIOLOGY

## 2025-04-28 ENCOUNTER — HOSPITAL ENCOUNTER (OUTPATIENT)
Facility: HOSPITAL | Age: 35
Setting detail: RADIATION/ONCOLOGY SERIES
Discharge: HOME | End: 2025-04-28
Payer: MEDICAID

## 2025-04-28 LAB
RAD ONC ARIA COURSE ID: 1
RAD ONC ARIA COURSE INTENT: NORMAL
RAD ONC ARIA COURSE LAST TREATMENT DATE: NORMAL
RAD ONC ARIA COURSE START DATE: NORMAL
RAD ONC ARIA COURSE TREATMENT ELAPSED DAYS: 21
RAD ONC ARIA FIRST TREATMENT DATE: NORMAL
RAD ONC ARIA PLAN FRACTIONS TREATED TO DATE: 16
RAD ONC ARIA PLAN FRACTIONS TREATED TO DATE: 16
RAD ONC ARIA PLAN ID: NORMAL
RAD ONC ARIA PLAN ID: NORMAL
RAD ONC ARIA PLAN NAME: NORMAL
RAD ONC ARIA PLAN NAME: NORMAL
RAD ONC ARIA PLAN PRESCRIBED DOSE PER FRACTION: 2 GY
RAD ONC ARIA PLAN PRESCRIBED DOSE PER FRACTION: 2 GY
RAD ONC ARIA PLAN PRIMARY REFERENCE POINT: NORMAL
RAD ONC ARIA PLAN PRIMARY REFERENCE POINT: NORMAL
RAD ONC ARIA PLAN TOTAL FRACTIONS PRESCRIBED: 25
RAD ONC ARIA PLAN TOTAL FRACTIONS PRESCRIBED: 25
RAD ONC ARIA PLAN TOTAL PRESCRIBED DOSE: 5000 CGY
RAD ONC ARIA PLAN TOTAL PRESCRIBED DOSE: 5000 CGY
RAD ONC ARIA REFERENCE POINT DOSAGE GIVEN TO DATE: 32 GY
RAD ONC ARIA REFERENCE POINT DOSAGE GIVEN TO DATE: 32 GY
RAD ONC ARIA REFERENCE POINT ID: NORMAL
RAD ONC ARIA REFERENCE POINT ID: NORMAL
RAD ONC ARIA REFERENCE POINT SESSION DOSAGE GIVEN: 2 GY
RAD ONC ARIA REFERENCE POINT SESSION DOSAGE GIVEN: 2 GY

## 2025-04-28 PROCEDURE — 77412 RADIATION TX DELIVERY LVL 3: CPT | Performed by: RADIOLOGY

## 2025-04-28 PROCEDURE — 77387 GUIDANCE FOR RADJ TX DLVR: CPT | Performed by: RADIOLOGY

## 2025-04-29 ENCOUNTER — DOCUMENTATION (OUTPATIENT)
Dept: NUTRITION | Facility: HOSPITAL | Age: 35
End: 2025-04-29
Payer: MEDICAID

## 2025-04-29 ENCOUNTER — HOSPITAL ENCOUNTER (OUTPATIENT)
Facility: HOSPITAL | Age: 35
Setting detail: RADIATION/ONCOLOGY SERIES
Discharge: HOME | End: 2025-04-29
Payer: MEDICAID

## 2025-04-29 ENCOUNTER — HOSPITAL ENCOUNTER (OUTPATIENT)
Facility: HOSPITAL | Age: 35
Discharge: HOME | End: 2025-04-29

## 2025-04-29 VITALS — BODY MASS INDEX: 33.67 KG/M2 | WEIGHT: 215 LBS

## 2025-04-29 LAB
RAD ONC ARIA COURSE ID: 1
RAD ONC ARIA COURSE INTENT: NORMAL
RAD ONC ARIA COURSE LAST TREATMENT DATE: NORMAL
RAD ONC ARIA COURSE START DATE: NORMAL
RAD ONC ARIA COURSE TREATMENT ELAPSED DAYS: 22
RAD ONC ARIA FIRST TREATMENT DATE: NORMAL
RAD ONC ARIA PLAN FRACTIONS TREATED TO DATE: 17
RAD ONC ARIA PLAN FRACTIONS TREATED TO DATE: 17
RAD ONC ARIA PLAN ID: NORMAL
RAD ONC ARIA PLAN ID: NORMAL
RAD ONC ARIA PLAN NAME: NORMAL
RAD ONC ARIA PLAN NAME: NORMAL
RAD ONC ARIA PLAN PRESCRIBED DOSE PER FRACTION: 2 GY
RAD ONC ARIA PLAN PRESCRIBED DOSE PER FRACTION: 2 GY
RAD ONC ARIA PLAN PRIMARY REFERENCE POINT: NORMAL
RAD ONC ARIA PLAN PRIMARY REFERENCE POINT: NORMAL
RAD ONC ARIA PLAN TOTAL FRACTIONS PRESCRIBED: 25
RAD ONC ARIA PLAN TOTAL FRACTIONS PRESCRIBED: 25
RAD ONC ARIA PLAN TOTAL PRESCRIBED DOSE: 5000 CGY
RAD ONC ARIA PLAN TOTAL PRESCRIBED DOSE: 5000 CGY
RAD ONC ARIA REFERENCE POINT DOSAGE GIVEN TO DATE: 34 GY
RAD ONC ARIA REFERENCE POINT DOSAGE GIVEN TO DATE: 34 GY
RAD ONC ARIA REFERENCE POINT ID: NORMAL
RAD ONC ARIA REFERENCE POINT ID: NORMAL
RAD ONC ARIA REFERENCE POINT SESSION DOSAGE GIVEN: 2 GY
RAD ONC ARIA REFERENCE POINT SESSION DOSAGE GIVEN: 2 GY

## 2025-04-29 PROCEDURE — 77387 GUIDANCE FOR RADJ TX DLVR: CPT | Performed by: RADIOLOGY

## 2025-04-29 PROCEDURE — 77412 RADIATION TX DELIVERY LVL 3: CPT | Performed by: RADIOLOGY

## 2025-04-29 NOTE — PROGRESS NOTES
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       Patient reports the past week has gone well. Patient reports she's still keeping track of her calories and trying to eat more throughout the day.      Patient reports some fatigue but no other nutrition impact symptoms at this time. Patient did say she spent most of the weekend doing yard work.      All questions/concerns addressed at this time.      Will follow as indicated.      Mariah Marsh, RD, LD

## 2025-04-30 ENCOUNTER — HOSPITAL ENCOUNTER (OUTPATIENT)
Facility: HOSPITAL | Age: 35
Setting detail: RADIATION/ONCOLOGY SERIES
Discharge: HOME | End: 2025-04-30
Payer: MEDICAID

## 2025-04-30 LAB
RAD ONC ARIA COURSE ID: 1
RAD ONC ARIA COURSE INTENT: NORMAL
RAD ONC ARIA COURSE LAST TREATMENT DATE: NORMAL
RAD ONC ARIA COURSE START DATE: NORMAL
RAD ONC ARIA COURSE TREATMENT ELAPSED DAYS: 23
RAD ONC ARIA FIRST TREATMENT DATE: NORMAL
RAD ONC ARIA PLAN FRACTIONS TREATED TO DATE: 18
RAD ONC ARIA PLAN FRACTIONS TREATED TO DATE: 18
RAD ONC ARIA PLAN ID: NORMAL
RAD ONC ARIA PLAN ID: NORMAL
RAD ONC ARIA PLAN NAME: NORMAL
RAD ONC ARIA PLAN NAME: NORMAL
RAD ONC ARIA PLAN PRESCRIBED DOSE PER FRACTION: 2 GY
RAD ONC ARIA PLAN PRESCRIBED DOSE PER FRACTION: 2 GY
RAD ONC ARIA PLAN PRIMARY REFERENCE POINT: NORMAL
RAD ONC ARIA PLAN PRIMARY REFERENCE POINT: NORMAL
RAD ONC ARIA PLAN TOTAL FRACTIONS PRESCRIBED: 25
RAD ONC ARIA PLAN TOTAL FRACTIONS PRESCRIBED: 25
RAD ONC ARIA PLAN TOTAL PRESCRIBED DOSE: 5000 CGY
RAD ONC ARIA PLAN TOTAL PRESCRIBED DOSE: 5000 CGY
RAD ONC ARIA REFERENCE POINT DOSAGE GIVEN TO DATE: 36 GY
RAD ONC ARIA REFERENCE POINT DOSAGE GIVEN TO DATE: 36 GY
RAD ONC ARIA REFERENCE POINT ID: NORMAL
RAD ONC ARIA REFERENCE POINT ID: NORMAL
RAD ONC ARIA REFERENCE POINT SESSION DOSAGE GIVEN: 2 GY
RAD ONC ARIA REFERENCE POINT SESSION DOSAGE GIVEN: 2 GY

## 2025-04-30 PROCEDURE — 77387 GUIDANCE FOR RADJ TX DLVR: CPT | Performed by: RADIOLOGY

## 2025-04-30 PROCEDURE — 77412 RADIATION TX DELIVERY LVL 3: CPT | Performed by: RADIOLOGY

## 2025-05-01 ENCOUNTER — OFFICE VISIT (OUTPATIENT)
Dept: ONCOLOGY | Facility: CLINIC | Age: 35
End: 2025-05-01
Payer: MEDICAID

## 2025-05-01 ENCOUNTER — HOSPITAL ENCOUNTER (OUTPATIENT)
Facility: HOSPITAL | Age: 35
Setting detail: RADIATION/ONCOLOGY SERIES
End: 2025-05-01
Payer: MEDICAID

## 2025-05-01 ENCOUNTER — HOSPITAL ENCOUNTER (OUTPATIENT)
Facility: HOSPITAL | Age: 35
Setting detail: RADIATION/ONCOLOGY SERIES
Discharge: HOME | End: 2025-05-01
Payer: MEDICAID

## 2025-05-01 VITALS
HEART RATE: 75 BPM | DIASTOLIC BLOOD PRESSURE: 77 MMHG | OXYGEN SATURATION: 98 % | SYSTOLIC BLOOD PRESSURE: 125 MMHG | HEIGHT: 67 IN | WEIGHT: 215.4 LBS | BODY MASS INDEX: 33.81 KG/M2 | TEMPERATURE: 97.3 F

## 2025-05-01 DIAGNOSIS — R31.9 HEMATURIA, UNSPECIFIED TYPE: Primary | ICD-10-CM

## 2025-05-01 DIAGNOSIS — C50.212 CARCINOMA OF UPPER-INNER QUADRANT OF FEMALE BREAST, LEFT: Primary | ICD-10-CM

## 2025-05-01 LAB
RAD ONC ARIA COURSE ID: 1
RAD ONC ARIA COURSE INTENT: NORMAL
RAD ONC ARIA COURSE LAST TREATMENT DATE: NORMAL
RAD ONC ARIA COURSE START DATE: NORMAL
RAD ONC ARIA COURSE TREATMENT ELAPSED DAYS: 24
RAD ONC ARIA FIRST TREATMENT DATE: NORMAL
RAD ONC ARIA PLAN FRACTIONS TREATED TO DATE: 19
RAD ONC ARIA PLAN FRACTIONS TREATED TO DATE: 19
RAD ONC ARIA PLAN ID: NORMAL
RAD ONC ARIA PLAN ID: NORMAL
RAD ONC ARIA PLAN NAME: NORMAL
RAD ONC ARIA PLAN NAME: NORMAL
RAD ONC ARIA PLAN PRESCRIBED DOSE PER FRACTION: 2 GY
RAD ONC ARIA PLAN PRESCRIBED DOSE PER FRACTION: 2 GY
RAD ONC ARIA PLAN PRIMARY REFERENCE POINT: NORMAL
RAD ONC ARIA PLAN PRIMARY REFERENCE POINT: NORMAL
RAD ONC ARIA PLAN TOTAL FRACTIONS PRESCRIBED: 25
RAD ONC ARIA PLAN TOTAL FRACTIONS PRESCRIBED: 25
RAD ONC ARIA PLAN TOTAL PRESCRIBED DOSE: 5000 CGY
RAD ONC ARIA PLAN TOTAL PRESCRIBED DOSE: 5000 CGY
RAD ONC ARIA REFERENCE POINT DOSAGE GIVEN TO DATE: 38 GY
RAD ONC ARIA REFERENCE POINT DOSAGE GIVEN TO DATE: 38 GY
RAD ONC ARIA REFERENCE POINT ID: NORMAL
RAD ONC ARIA REFERENCE POINT ID: NORMAL
RAD ONC ARIA REFERENCE POINT SESSION DOSAGE GIVEN: 2 GY
RAD ONC ARIA REFERENCE POINT SESSION DOSAGE GIVEN: 2 GY

## 2025-05-01 PROCEDURE — 99214 OFFICE O/P EST MOD 30 MIN: CPT | Performed by: INTERNAL MEDICINE

## 2025-05-01 PROCEDURE — 77412 RADIATION TX DELIVERY LVL 3: CPT | Performed by: RADIOLOGY

## 2025-05-01 PROCEDURE — 1125F AMNT PAIN NOTED PAIN PRSNT: CPT | Performed by: INTERNAL MEDICINE

## 2025-05-01 PROCEDURE — 77387 GUIDANCE FOR RADJ TX DLVR: CPT | Performed by: RADIOLOGY

## 2025-05-01 RX ORDER — ANASTROZOLE 1 MG/1
1 TABLET ORAL DAILY
Qty: 30 TABLET | Refills: 11 | Status: SHIPPED | OUTPATIENT
Start: 2025-05-01

## 2025-05-01 NOTE — PROGRESS NOTES
"      PROBLEM LIST:  [id number] ER+ (70%, 3+) UT+ (60%, 3+), Her2 negative (0+) invasive ductal carcinoma of the left breast  CT chest abdomen and pelvis and bone scan done at outside hospital showed no evidence of metastatic disease.  Bilateral mastectomy on 11/12/24.  Pathology showed a 3.5 cm high grade tumor.  1/2 SLN involved.  2 additional axillary LN negative for metastatic carcinoma.  Adjuvant chemotherapy with taxotere and cytoxan started 12/11/24.  Rheumatoid arthritis  anxiety    Subjective     CHIEF COMPLAINT: breast cancer    HISTORY OF PRESENT ILLNESS:   Eliane Argueta returns for follow-up.       She will complete radiation next week.  She says she is doing okay.  She has been frustrated by weight gain.  She has gained about 30 pounds over the past few months.  She is meeting with nutrition services.  She says she walks about 3 miles a day.    Objective      /77   Pulse 75   Temp 97.3 °F (36.3 °C) (Infrared)   Ht 170.2 cm (67\")   Wt 97.7 kg (215 lb 6.4 oz)   SpO2 98%   BMI 33.74 kg/m²      Performance Status:  ECOG score: 0     General: well appearing female in no acute distress  Neuro: alert and oriented  HEENT: sclera anicteric, oropharynx clear  Skin: no rashes, lesions, bruising, or petechiae  Psych: mood and affect appropriate    I have reexamined the patient and the results are consistent with the previously documented exam. Angela Horvath MD    RECENT LABS:  Lab Results   Component Value Date    WBC 13.44 (H) 02/13/2025    HGB 11.1 (L) 02/13/2025    HCT 32.9 (L) 02/13/2025    MCV 88.9 02/13/2025     02/13/2025       Lab Results   Component Value Date    GLUCOSE 155 (H) 02/13/2025    BUN 10 02/13/2025    CREATININE 0.79 02/13/2025    BCR 12.7 02/13/2025    K 4.5 02/13/2025    CO2 22.0 02/13/2025    CALCIUM 10.1 02/13/2025    ALBUMIN 4.6 02/13/2025    AST 42 (H) 02/13/2025    ALT 69 (H) 02/13/2025           ASSESSMENT AND PLAN:     Eliane Argueta is a 35 y.o. female with a " T2 N1 M0 ER positive HER2 negative invasive ductal carcinoma of the left breast.     Completed 4 cycles of adjuvant chemotherapy.  She will complete radiation next week.  We discussed starting endocrine therapy with ovarian suppression plus anastrozole.  Will plan to begin the Lupron injections next week when she is finishing of her radiation treatment.  I will give her prescription for anastrozole to start about 1 month after that.    We discussed that sometimes younger patients will need more frequent Lupron injections, but for now we will start with every 3 months and monitor her estradiol levels to make sure that she is adequately suppressed.    Briefly discussed the addition of Kisqali which we will consider at her next follow-up.  She is planning reconstruction and we may need to consider the timing of this as well.    Hot flashes: She has not been taking the Veozah because her hot flashes had improved.  She has this to use if needed.  She did not have any LFTs abnormalities previously.    Depression: Continue Wellbutrin per her PCP.    Follow-up in 3 months.      Angela Horvath MD  [facility]    5/1/2025

## 2025-05-02 ENCOUNTER — HOSPITAL ENCOUNTER (OUTPATIENT)
Facility: HOSPITAL | Age: 35
Setting detail: RADIATION/ONCOLOGY SERIES
Discharge: HOME | End: 2025-05-02
Payer: MEDICAID

## 2025-05-02 LAB
RAD ONC ARIA COURSE ID: 1
RAD ONC ARIA COURSE INTENT: NORMAL
RAD ONC ARIA COURSE LAST TREATMENT DATE: NORMAL
RAD ONC ARIA COURSE START DATE: NORMAL
RAD ONC ARIA COURSE TREATMENT ELAPSED DAYS: 25
RAD ONC ARIA FIRST TREATMENT DATE: NORMAL
RAD ONC ARIA PLAN FRACTIONS TREATED TO DATE: 20
RAD ONC ARIA PLAN FRACTIONS TREATED TO DATE: 20
RAD ONC ARIA PLAN ID: NORMAL
RAD ONC ARIA PLAN ID: NORMAL
RAD ONC ARIA PLAN NAME: NORMAL
RAD ONC ARIA PLAN NAME: NORMAL
RAD ONC ARIA PLAN PRESCRIBED DOSE PER FRACTION: 2 GY
RAD ONC ARIA PLAN PRESCRIBED DOSE PER FRACTION: 2 GY
RAD ONC ARIA PLAN PRIMARY REFERENCE POINT: NORMAL
RAD ONC ARIA PLAN PRIMARY REFERENCE POINT: NORMAL
RAD ONC ARIA PLAN TOTAL FRACTIONS PRESCRIBED: 25
RAD ONC ARIA PLAN TOTAL FRACTIONS PRESCRIBED: 25
RAD ONC ARIA PLAN TOTAL PRESCRIBED DOSE: 5000 CGY
RAD ONC ARIA PLAN TOTAL PRESCRIBED DOSE: 5000 CGY
RAD ONC ARIA REFERENCE POINT DOSAGE GIVEN TO DATE: 40 GY
RAD ONC ARIA REFERENCE POINT DOSAGE GIVEN TO DATE: 40 GY
RAD ONC ARIA REFERENCE POINT ID: NORMAL
RAD ONC ARIA REFERENCE POINT ID: NORMAL
RAD ONC ARIA REFERENCE POINT SESSION DOSAGE GIVEN: 2 GY
RAD ONC ARIA REFERENCE POINT SESSION DOSAGE GIVEN: 2 GY

## 2025-05-02 PROCEDURE — 77387 GUIDANCE FOR RADJ TX DLVR: CPT | Performed by: RADIOLOGY

## 2025-05-02 PROCEDURE — 77412 RADIATION TX DELIVERY LVL 3: CPT | Performed by: RADIOLOGY

## 2025-05-05 ENCOUNTER — HOSPITAL ENCOUNTER (OUTPATIENT)
Facility: HOSPITAL | Age: 35
Setting detail: RADIATION/ONCOLOGY SERIES
Discharge: HOME | End: 2025-05-05
Payer: MEDICAID

## 2025-05-05 LAB
RAD ONC ARIA COURSE ID: 1
RAD ONC ARIA COURSE INTENT: NORMAL
RAD ONC ARIA COURSE LAST TREATMENT DATE: NORMAL
RAD ONC ARIA COURSE START DATE: NORMAL
RAD ONC ARIA COURSE TREATMENT ELAPSED DAYS: 28
RAD ONC ARIA FIRST TREATMENT DATE: NORMAL
RAD ONC ARIA PLAN FRACTIONS TREATED TO DATE: 21
RAD ONC ARIA PLAN FRACTIONS TREATED TO DATE: 21
RAD ONC ARIA PLAN ID: NORMAL
RAD ONC ARIA PLAN ID: NORMAL
RAD ONC ARIA PLAN NAME: NORMAL
RAD ONC ARIA PLAN NAME: NORMAL
RAD ONC ARIA PLAN PRESCRIBED DOSE PER FRACTION: 2 GY
RAD ONC ARIA PLAN PRESCRIBED DOSE PER FRACTION: 2 GY
RAD ONC ARIA PLAN PRIMARY REFERENCE POINT: NORMAL
RAD ONC ARIA PLAN PRIMARY REFERENCE POINT: NORMAL
RAD ONC ARIA PLAN TOTAL FRACTIONS PRESCRIBED: 25
RAD ONC ARIA PLAN TOTAL FRACTIONS PRESCRIBED: 25
RAD ONC ARIA PLAN TOTAL PRESCRIBED DOSE: 5000 CGY
RAD ONC ARIA PLAN TOTAL PRESCRIBED DOSE: 5000 CGY
RAD ONC ARIA REFERENCE POINT DOSAGE GIVEN TO DATE: 42 GY
RAD ONC ARIA REFERENCE POINT DOSAGE GIVEN TO DATE: 42 GY
RAD ONC ARIA REFERENCE POINT ID: NORMAL
RAD ONC ARIA REFERENCE POINT ID: NORMAL
RAD ONC ARIA REFERENCE POINT SESSION DOSAGE GIVEN: 2 GY
RAD ONC ARIA REFERENCE POINT SESSION DOSAGE GIVEN: 2 GY

## 2025-05-05 PROCEDURE — 77412 RADIATION TX DELIVERY LVL 3: CPT | Performed by: RADIOLOGY

## 2025-05-05 PROCEDURE — 77387 GUIDANCE FOR RADJ TX DLVR: CPT | Performed by: RADIOLOGY

## 2025-05-06 ENCOUNTER — HOSPITAL ENCOUNTER (OUTPATIENT)
Facility: HOSPITAL | Age: 35
Setting detail: RADIATION/ONCOLOGY SERIES
Discharge: HOME | End: 2025-05-06
Payer: MEDICAID

## 2025-05-06 DIAGNOSIS — C50.212 CARCINOMA OF UPPER-INNER QUADRANT OF FEMALE BREAST, LEFT: Primary | ICD-10-CM

## 2025-05-06 LAB
RAD ONC ARIA COURSE ID: 1
RAD ONC ARIA COURSE INTENT: NORMAL
RAD ONC ARIA COURSE LAST TREATMENT DATE: NORMAL
RAD ONC ARIA COURSE START DATE: NORMAL
RAD ONC ARIA COURSE TREATMENT ELAPSED DAYS: 29
RAD ONC ARIA FIRST TREATMENT DATE: NORMAL
RAD ONC ARIA PLAN FRACTIONS TREATED TO DATE: 22
RAD ONC ARIA PLAN FRACTIONS TREATED TO DATE: 22
RAD ONC ARIA PLAN ID: NORMAL
RAD ONC ARIA PLAN ID: NORMAL
RAD ONC ARIA PLAN NAME: NORMAL
RAD ONC ARIA PLAN NAME: NORMAL
RAD ONC ARIA PLAN PRESCRIBED DOSE PER FRACTION: 2 GY
RAD ONC ARIA PLAN PRESCRIBED DOSE PER FRACTION: 2 GY
RAD ONC ARIA PLAN PRIMARY REFERENCE POINT: NORMAL
RAD ONC ARIA PLAN PRIMARY REFERENCE POINT: NORMAL
RAD ONC ARIA PLAN TOTAL FRACTIONS PRESCRIBED: 25
RAD ONC ARIA PLAN TOTAL FRACTIONS PRESCRIBED: 25
RAD ONC ARIA PLAN TOTAL PRESCRIBED DOSE: 5000 CGY
RAD ONC ARIA PLAN TOTAL PRESCRIBED DOSE: 5000 CGY
RAD ONC ARIA REFERENCE POINT DOSAGE GIVEN TO DATE: 44 GY
RAD ONC ARIA REFERENCE POINT DOSAGE GIVEN TO DATE: 44 GY
RAD ONC ARIA REFERENCE POINT ID: NORMAL
RAD ONC ARIA REFERENCE POINT ID: NORMAL
RAD ONC ARIA REFERENCE POINT SESSION DOSAGE GIVEN: 2 GY
RAD ONC ARIA REFERENCE POINT SESSION DOSAGE GIVEN: 2 GY

## 2025-05-06 PROCEDURE — 77412 RADIATION TX DELIVERY LVL 3: CPT | Performed by: RADIOLOGY

## 2025-05-06 PROCEDURE — 77387 GUIDANCE FOR RADJ TX DLVR: CPT | Performed by: RADIOLOGY

## 2025-05-07 ENCOUNTER — APPOINTMENT (OUTPATIENT)
Facility: HOSPITAL | Age: 35
End: 2025-05-07
Payer: MEDICAID

## 2025-05-07 ENCOUNTER — HOSPITAL ENCOUNTER (OUTPATIENT)
Facility: HOSPITAL | Age: 35
Discharge: HOME | End: 2025-05-07

## 2025-05-07 LAB
RAD ONC ARIA COURSE ID: 1
RAD ONC ARIA COURSE INTENT: NORMAL
RAD ONC ARIA COURSE LAST TREATMENT DATE: NORMAL
RAD ONC ARIA COURSE START DATE: NORMAL
RAD ONC ARIA COURSE TREATMENT ELAPSED DAYS: 30
RAD ONC ARIA FIRST TREATMENT DATE: NORMAL
RAD ONC ARIA PLAN FRACTIONS TREATED TO DATE: 23
RAD ONC ARIA PLAN FRACTIONS TREATED TO DATE: 23
RAD ONC ARIA PLAN ID: NORMAL
RAD ONC ARIA PLAN ID: NORMAL
RAD ONC ARIA PLAN NAME: NORMAL
RAD ONC ARIA PLAN NAME: NORMAL
RAD ONC ARIA PLAN PRESCRIBED DOSE PER FRACTION: 2 GY
RAD ONC ARIA PLAN PRESCRIBED DOSE PER FRACTION: 2 GY
RAD ONC ARIA PLAN PRIMARY REFERENCE POINT: NORMAL
RAD ONC ARIA PLAN PRIMARY REFERENCE POINT: NORMAL
RAD ONC ARIA PLAN TOTAL FRACTIONS PRESCRIBED: 25
RAD ONC ARIA PLAN TOTAL FRACTIONS PRESCRIBED: 25
RAD ONC ARIA PLAN TOTAL PRESCRIBED DOSE: 5000 CGY
RAD ONC ARIA PLAN TOTAL PRESCRIBED DOSE: 5000 CGY
RAD ONC ARIA REFERENCE POINT DOSAGE GIVEN TO DATE: 46 GY
RAD ONC ARIA REFERENCE POINT DOSAGE GIVEN TO DATE: 46 GY
RAD ONC ARIA REFERENCE POINT ID: NORMAL
RAD ONC ARIA REFERENCE POINT ID: NORMAL
RAD ONC ARIA REFERENCE POINT SESSION DOSAGE GIVEN: 2 GY
RAD ONC ARIA REFERENCE POINT SESSION DOSAGE GIVEN: 2 GY

## 2025-05-07 PROCEDURE — 77387 GUIDANCE FOR RADJ TX DLVR: CPT | Performed by: RADIOLOGY

## 2025-05-07 PROCEDURE — 77412 RADIATION TX DELIVERY LVL 3: CPT | Performed by: RADIOLOGY

## 2025-05-08 ENCOUNTER — HOSPITAL ENCOUNTER (OUTPATIENT)
Facility: HOSPITAL | Age: 35
Discharge: HOME | End: 2025-05-08
Admitting: INTERNAL MEDICINE
Payer: MEDICAID

## 2025-05-08 ENCOUNTER — HOSPITAL ENCOUNTER (OUTPATIENT)
Facility: HOSPITAL | Age: 35
Setting detail: RADIATION/ONCOLOGY SERIES
Discharge: HOME | End: 2025-05-08

## 2025-05-08 VITALS
WEIGHT: 213.6 LBS | DIASTOLIC BLOOD PRESSURE: 70 MMHG | BODY MASS INDEX: 33.53 KG/M2 | HEIGHT: 67 IN | SYSTOLIC BLOOD PRESSURE: 106 MMHG | TEMPERATURE: 98.6 F | RESPIRATION RATE: 18 BRPM | HEART RATE: 80 BPM

## 2025-05-08 DIAGNOSIS — Z17.0 MALIGNANT NEOPLASM OF LEFT BREAST IN FEMALE, ESTROGEN RECEPTOR POSITIVE, UNSPECIFIED SITE OF BREAST: ICD-10-CM

## 2025-05-08 DIAGNOSIS — C50.912 MALIGNANT NEOPLASM OF LEFT BREAST IN FEMALE, ESTROGEN RECEPTOR POSITIVE, UNSPECIFIED SITE OF BREAST: ICD-10-CM

## 2025-05-08 DIAGNOSIS — C50.212 CARCINOMA OF UPPER-INNER QUADRANT OF FEMALE BREAST, LEFT: Primary | ICD-10-CM

## 2025-05-08 LAB
ALBUMIN SERPL-MCNC: 4.4 G/DL (ref 3.5–5.2)
ALBUMIN/GLOB SERPL: 1.8 G/DL
ALP SERPL-CCNC: 92 U/L (ref 39–117)
ALT SERPL W P-5'-P-CCNC: 21 U/L (ref 1–33)
ANION GAP SERPL CALCULATED.3IONS-SCNC: 12.6 MMOL/L (ref 5–15)
AST SERPL-CCNC: 22 U/L (ref 1–32)
BACTERIA UR QL AUTO: ABNORMAL /HPF
BASOPHILS # BLD AUTO: 0.02 10*3/MM3 (ref 0–0.2)
BASOPHILS NFR BLD AUTO: 0.4 % (ref 0–1.5)
BILIRUB SERPL-MCNC: 0.2 MG/DL (ref 0–1.2)
BILIRUB UR QL STRIP: NEGATIVE
BUN SERPL-MCNC: 11 MG/DL (ref 6–20)
BUN/CREAT SERPL: 13.8 (ref 7–25)
CALCIUM SPEC-SCNC: 10 MG/DL (ref 8.6–10.5)
CHLORIDE SERPL-SCNC: 106 MMOL/L (ref 98–107)
CLARITY UR: CLEAR
CO2 SERPL-SCNC: 23.4 MMOL/L (ref 22–29)
COLOR UR: YELLOW
CREAT SERPL-MCNC: 0.8 MG/DL (ref 0.57–1)
DEPRECATED RDW RBC AUTO: 40.4 FL (ref 37–54)
EGFRCR SERPLBLD CKD-EPI 2021: 98.1 ML/MIN/1.73
EOSINOPHIL # BLD AUTO: 0.07 10*3/MM3 (ref 0–0.4)
EOSINOPHIL NFR BLD AUTO: 1.6 % (ref 0.3–6.2)
ERYTHROCYTE [DISTWIDTH] IN BLOOD BY AUTOMATED COUNT: 12.8 % (ref 12.3–15.4)
GLOBULIN UR ELPH-MCNC: 2.5 GM/DL
GLUCOSE SERPL-MCNC: 102 MG/DL (ref 65–99)
GLUCOSE UR STRIP-MCNC: NEGATIVE MG/DL
HCT VFR BLD AUTO: 37.7 % (ref 34–46.6)
HGB BLD-MCNC: 12.8 G/DL (ref 12–15.9)
HGB UR QL STRIP.AUTO: ABNORMAL
HYALINE CASTS UR QL AUTO: ABNORMAL /LPF
IMM GRANULOCYTES # BLD AUTO: 0.01 10*3/MM3 (ref 0–0.05)
IMM GRANULOCYTES NFR BLD AUTO: 0.2 % (ref 0–0.5)
KETONES UR QL STRIP: NEGATIVE
LEUKOCYTE ESTERASE UR QL STRIP.AUTO: NEGATIVE
LYMPHOCYTES # BLD AUTO: 0.57 10*3/MM3 (ref 0.7–3.1)
LYMPHOCYTES NFR BLD AUTO: 12.7 % (ref 19.6–45.3)
MCH RBC QN AUTO: 28.9 PG (ref 26.6–33)
MCHC RBC AUTO-ENTMCNC: 34 G/DL (ref 31.5–35.7)
MCV RBC AUTO: 85.1 FL (ref 79–97)
MONOCYTES # BLD AUTO: 0.5 10*3/MM3 (ref 0.1–0.9)
MONOCYTES NFR BLD AUTO: 11.1 % (ref 5–12)
NEUTROPHILS NFR BLD AUTO: 3.33 10*3/MM3 (ref 1.7–7)
NEUTROPHILS NFR BLD AUTO: 74 % (ref 42.7–76)
NITRITE UR QL STRIP: NEGATIVE
PH UR STRIP.AUTO: 6 [PH] (ref 5–8)
PLATELET # BLD AUTO: 218 10*3/MM3 (ref 140–450)
PMV BLD AUTO: 9.2 FL (ref 6–12)
POTASSIUM SERPL-SCNC: 4.1 MMOL/L (ref 3.5–5.2)
PROT SERPL-MCNC: 6.9 G/DL (ref 6–8.5)
PROT UR QL STRIP: NEGATIVE
RAD ONC ARIA COURSE ID: 1
RAD ONC ARIA COURSE INTENT: NORMAL
RAD ONC ARIA COURSE LAST TREATMENT DATE: NORMAL
RAD ONC ARIA COURSE START DATE: NORMAL
RAD ONC ARIA COURSE TREATMENT ELAPSED DAYS: 31
RAD ONC ARIA FIRST TREATMENT DATE: NORMAL
RAD ONC ARIA PLAN FRACTIONS TREATED TO DATE: 24
RAD ONC ARIA PLAN FRACTIONS TREATED TO DATE: 24
RAD ONC ARIA PLAN ID: NORMAL
RAD ONC ARIA PLAN ID: NORMAL
RAD ONC ARIA PLAN NAME: NORMAL
RAD ONC ARIA PLAN NAME: NORMAL
RAD ONC ARIA PLAN PRESCRIBED DOSE PER FRACTION: 2 GY
RAD ONC ARIA PLAN PRESCRIBED DOSE PER FRACTION: 2 GY
RAD ONC ARIA PLAN PRIMARY REFERENCE POINT: NORMAL
RAD ONC ARIA PLAN PRIMARY REFERENCE POINT: NORMAL
RAD ONC ARIA PLAN TOTAL FRACTIONS PRESCRIBED: 25
RAD ONC ARIA PLAN TOTAL FRACTIONS PRESCRIBED: 25
RAD ONC ARIA PLAN TOTAL PRESCRIBED DOSE: 5000 CGY
RAD ONC ARIA PLAN TOTAL PRESCRIBED DOSE: 5000 CGY
RAD ONC ARIA REFERENCE POINT DOSAGE GIVEN TO DATE: 48 GY
RAD ONC ARIA REFERENCE POINT DOSAGE GIVEN TO DATE: 48 GY
RAD ONC ARIA REFERENCE POINT ID: NORMAL
RAD ONC ARIA REFERENCE POINT ID: NORMAL
RAD ONC ARIA REFERENCE POINT SESSION DOSAGE GIVEN: 2 GY
RAD ONC ARIA REFERENCE POINT SESSION DOSAGE GIVEN: 2 GY
RBC # BLD AUTO: 4.43 10*6/MM3 (ref 3.77–5.28)
RBC # UR STRIP: ABNORMAL /HPF
REF LAB TEST METHOD: ABNORMAL
SODIUM SERPL-SCNC: 142 MMOL/L (ref 136–145)
SP GR UR STRIP: 1.01 (ref 1–1.03)
SQUAMOUS #/AREA URNS HPF: ABNORMAL /HPF
UROBILINOGEN UR QL STRIP: ABNORMAL
WBC # UR STRIP: ABNORMAL /HPF
WBC NRBC COR # BLD AUTO: 4.5 10*3/MM3 (ref 3.4–10.8)

## 2025-05-08 PROCEDURE — 80053 COMPREHEN METABOLIC PANEL: CPT | Performed by: INTERNAL MEDICINE

## 2025-05-08 PROCEDURE — 85025 COMPLETE CBC W/AUTO DIFF WBC: CPT | Performed by: INTERNAL MEDICINE

## 2025-05-08 PROCEDURE — 25010000002 LEUPROLIDE 22.5 MG KIT: Performed by: INTERNAL MEDICINE

## 2025-05-08 PROCEDURE — 96402 CHEMO HORMON ANTINEOPL SQ/IM: CPT

## 2025-05-08 PROCEDURE — 82670 ASSAY OF TOTAL ESTRADIOL: CPT | Performed by: INTERNAL MEDICINE

## 2025-05-08 PROCEDURE — 77412 RADIATION TX DELIVERY LVL 3: CPT | Performed by: RADIOLOGY

## 2025-05-08 PROCEDURE — 77387 GUIDANCE FOR RADJ TX DLVR: CPT | Performed by: RADIOLOGY

## 2025-05-08 PROCEDURE — 81001 URINALYSIS AUTO W/SCOPE: CPT | Performed by: INTERNAL MEDICINE

## 2025-05-08 RX ADMIN — LEUPROLIDE ACETATE 22.5 MG: 22.5 INJECTION, SUSPENSION, EXTENDED RELEASE SUBCUTANEOUS at 09:56

## 2025-05-09 ENCOUNTER — HOSPITAL ENCOUNTER (OUTPATIENT)
Facility: HOSPITAL | Age: 35
Setting detail: RADIATION/ONCOLOGY SERIES
Discharge: HOME | End: 2025-05-09

## 2025-05-09 ENCOUNTER — DOCUMENTATION (OUTPATIENT)
Dept: NUTRITION | Facility: HOSPITAL | Age: 35
End: 2025-05-09
Payer: MEDICAID

## 2025-05-09 ENCOUNTER — HOSPITAL ENCOUNTER (OUTPATIENT)
Facility: HOSPITAL | Age: 35
Discharge: HOME | End: 2025-05-09

## 2025-05-09 VITALS — WEIGHT: 215 LBS | BODY MASS INDEX: 33.67 KG/M2

## 2025-05-09 LAB
ESTRADIOL SERPL HS-MCNC: 45.6 PG/ML
RAD ONC ARIA COURSE ID: 1
RAD ONC ARIA COURSE INTENT: NORMAL
RAD ONC ARIA COURSE LAST TREATMENT DATE: NORMAL
RAD ONC ARIA COURSE START DATE: NORMAL
RAD ONC ARIA COURSE TREATMENT ELAPSED DAYS: 32
RAD ONC ARIA FIRST TREATMENT DATE: NORMAL
RAD ONC ARIA PLAN FRACTIONS TREATED TO DATE: 25
RAD ONC ARIA PLAN FRACTIONS TREATED TO DATE: 25
RAD ONC ARIA PLAN ID: NORMAL
RAD ONC ARIA PLAN ID: NORMAL
RAD ONC ARIA PLAN NAME: NORMAL
RAD ONC ARIA PLAN NAME: NORMAL
RAD ONC ARIA PLAN PRESCRIBED DOSE PER FRACTION: 2 GY
RAD ONC ARIA PLAN PRESCRIBED DOSE PER FRACTION: 2 GY
RAD ONC ARIA PLAN PRIMARY REFERENCE POINT: NORMAL
RAD ONC ARIA PLAN PRIMARY REFERENCE POINT: NORMAL
RAD ONC ARIA PLAN TOTAL FRACTIONS PRESCRIBED: 25
RAD ONC ARIA PLAN TOTAL FRACTIONS PRESCRIBED: 25
RAD ONC ARIA PLAN TOTAL PRESCRIBED DOSE: 5000 CGY
RAD ONC ARIA PLAN TOTAL PRESCRIBED DOSE: 5000 CGY
RAD ONC ARIA REFERENCE POINT DOSAGE GIVEN TO DATE: 50 GY
RAD ONC ARIA REFERENCE POINT DOSAGE GIVEN TO DATE: 50 GY
RAD ONC ARIA REFERENCE POINT ID: NORMAL
RAD ONC ARIA REFERENCE POINT ID: NORMAL
RAD ONC ARIA REFERENCE POINT SESSION DOSAGE GIVEN: 2 GY
RAD ONC ARIA REFERENCE POINT SESSION DOSAGE GIVEN: 2 GY

## 2025-05-09 PROCEDURE — 77412 RADIATION TX DELIVERY LVL 3: CPT | Performed by: RADIOLOGY

## 2025-05-09 PROCEDURE — 77387 GUIDANCE FOR RADJ TX DLVR: CPT | Performed by: RADIOLOGY

## 2025-05-09 PROCEDURE — 77336 RADIATION PHYSICS CONSULT: CPT | Performed by: RADIOLOGY

## 2025-05-09 NOTE — PROGRESS NOTES
ONC Nutrition     Diagnosis: ER positive HER2 negative invasive ductal carcinoma of the left breast     Surgery: Bilateral mastectomy - 11/12/24 / Pathology showed a 3.5 cm high grade tumor / 1/2 SLN involved / 2 additional axillary LN negative for metastatic carcinoma      Chemotherapy: OP BREAST TC DOCEtaxel / Cyclophosphamide IV - every 21 days x 4 cycles - completed 2/14/25     Radiation: 25 fractions - completed 5/9/25     Weight:  Stable since Dec 2024; 201-206#  4/8/25- 217#  4/22/25- 215#  5/9/25- 215#     Patient completed 25/25 treatments today.       Patient reports the past week has gone well. Patient reports she's excited to be done and reports no other nutrition impact symptoms at this time.       All questions/concerns addressed at this time.      Will follow as indicated.      Mariah Marsh RD, LD

## 2025-05-13 LAB
RAD ONC ARIA COURSE END DATE: NORMAL
RAD ONC ARIA COURSE ID: 1
RAD ONC ARIA COURSE INTENT: NORMAL
RAD ONC ARIA COURSE LAST TREATMENT DATE: NORMAL
RAD ONC ARIA COURSE START DATE: NORMAL
RAD ONC ARIA COURSE TREATMENT ELAPSED DAYS: 32
RAD ONC ARIA FIRST TREATMENT DATE: NORMAL
RAD ONC ARIA PLAN FRACTIONS TREATED TO DATE: 25
RAD ONC ARIA PLAN FRACTIONS TREATED TO DATE: 25
RAD ONC ARIA PLAN ID: NORMAL
RAD ONC ARIA PLAN ID: NORMAL
RAD ONC ARIA PLAN NAME: NORMAL
RAD ONC ARIA PLAN NAME: NORMAL
RAD ONC ARIA PLAN PRESCRIBED DOSE PER FRACTION: 2 GY
RAD ONC ARIA PLAN PRESCRIBED DOSE PER FRACTION: 2 GY
RAD ONC ARIA PLAN PRIMARY REFERENCE POINT: NORMAL
RAD ONC ARIA PLAN PRIMARY REFERENCE POINT: NORMAL
RAD ONC ARIA PLAN TOTAL FRACTIONS PRESCRIBED: 25
RAD ONC ARIA PLAN TOTAL FRACTIONS PRESCRIBED: 25
RAD ONC ARIA PLAN TOTAL PRESCRIBED DOSE: 5000 CGY
RAD ONC ARIA PLAN TOTAL PRESCRIBED DOSE: 5000 CGY
RAD ONC ARIA REFERENCE POINT DOSAGE GIVEN TO DATE: 50 GY
RAD ONC ARIA REFERENCE POINT DOSAGE GIVEN TO DATE: 50 GY
RAD ONC ARIA REFERENCE POINT ID: NORMAL
RAD ONC ARIA REFERENCE POINT ID: NORMAL

## 2025-06-06 ENCOUNTER — HOSPITAL ENCOUNTER (OUTPATIENT)
Facility: HOSPITAL | Age: 35
Setting detail: RADIATION/ONCOLOGY SERIES
End: 2025-06-06
Payer: MEDICAID

## 2025-06-10 ENCOUNTER — OFFICE VISIT (OUTPATIENT)
Age: 35
End: 2025-06-10
Payer: MEDICAID

## 2025-06-10 VITALS
DIASTOLIC BLOOD PRESSURE: 63 MMHG | HEART RATE: 69 BPM | BODY MASS INDEX: 33.74 KG/M2 | OXYGEN SATURATION: 96 % | RESPIRATION RATE: 16 BRPM | WEIGHT: 215 LBS | HEIGHT: 67 IN | SYSTOLIC BLOOD PRESSURE: 102 MMHG | TEMPERATURE: 98 F

## 2025-06-10 DIAGNOSIS — C50.212 CARCINOMA OF UPPER-INNER QUADRANT OF FEMALE BREAST, LEFT: Primary | ICD-10-CM

## 2025-06-10 PROCEDURE — G0463 HOSPITAL OUTPT CLINIC VISIT: HCPCS

## 2025-06-10 NOTE — PROGRESS NOTES
"FOLLOW UP NOTE    PATIENT:                                                      Eliane Argueta  MEDICAL RECORD #:                        [id number]  :                                                          1990  COMPLETION DATE:   ***  DIAGNOSIS:     No matching staging information was found for the patient.      BRIEF HISTORY:    ***    MEDICATIONS: Medication reconciliation for the patient was reviewed and confirmed in the electronic medical record.    Review of Systems - Oncology    Karnofsky score: 90     Physical Exam    VITAL SIGNS:   Vitals:    06/10/25 0840   BP: 102/63   Pulse: 69   Resp: 16   Temp: 98 °F (36.7 °C)   TempSrc: Oral   SpO2: 96%   Weight: 97.5 kg (215 lb)   Height: 170.2 cm (67.01\")   PainSc: 2    PainLoc: Comment: left axilla radiates to anterior and posterior ribs             Karnofsky score: 90     {KSP (Optional):66699}    The following portions of the patient's history were reviewed and updated as appropriate: allergies, current medications, past family history, past medical history, past social history, past surgical history and problem list.         There are no diagnoses linked to this encounter.     IMPRESSION:  ***    RECOMMENDATIONS:  ***    I spent a total of *** minutes on todays visit, with more than *** minutes in direct face to face communication, and the remainder of the time spent in reviewing the relevant history, records, available imaging, and for documentation.    No follow-ups on file.    Ghazal Mcmahon RN        "

## 2025-06-10 NOTE — PROGRESS NOTES
Follow Up Office Visit      Encounter Date: 06/10/2025   Patient Name: Eliane Argueta  YOB: 1990   Medical Record Number: [id number]   Primary Diagnosis: No primary diagnosis found.   Cancer Staging: Cancer Staging   No matching staging information was found for the patient.                Cancer Staging   No matching staging information was found for the patient.          Chief Complaint:    Chief Complaint   Patient presents with    Breast Cancer     Follow Up       Oncologic History: Eliane Argueta is a 35 y.o. female here for follow up regarding her invasive ductal carcinoma of the left breast.      She is accompanied by her spouse today. They are both working parents and have teenage children.  Her initial diagnostic imaging and biopsy was performed at an outside facility, with biopsy of a left breast returning as a +/-/- gr 3 invasive ductal carcinoma with LVI.   Dr. Micki Arrieta performed bilateral mastectomies with a left SLN eval and subsequent axillary dissection. Pathology returned benign from the right breast. The left breast had a gr 3 invasive ductal carcinoma (3.5 cm) with extensive ductal/lbular spread resected with negative margins. 1/2 SLN were involved and 0/2 additional axillary nodes dissected were involved.   [id number]    Interval History:   Completed adjuvant chemo, completed adjuvant RT (50 Gy/25 fractions) on 4/7/25. Dermatitis improving - hyperpigmentation involving SCV field. Esophagitis resolved. Good ROM in L arm. Zach VAZQUEZ. Saw plastic surgery team with plan for delayed reconstruction, potentially in fall 2025.     Prior Radiation: Yes  Completion Date: 5/9/2025        Subjective      Review of Systems: Review of Systems   Constitutional:  Negative for activity change, appetite change, chills, fatigue, fever and unexpected weight change.   HENT:  Negative for sore throat and trouble swallowing.    Eyes: Negative.    Respiratory:   "Negative for cough, chest tightness and shortness of breath.    Cardiovascular:  Negative for chest pain and leg swelling.   Gastrointestinal:  Negative for abdominal pain, diarrhea, nausea and vomiting.   Endocrine:        Hot flashes     Genitourinary: Negative.    Musculoskeletal:  Positive for arthralgias (generalized, no change with Anastrozole).   Skin:  Positive for wound (hyperpigmentation, desquamation resolved, reports itching, using daily moisturizer).   Allergic/Immunologic: Negative.    Neurological:  Positive for headaches (every other day, mostly relieved with Tylenol). Negative for dizziness and light-headedness.   Hematological: Negative.    Psychiatric/Behavioral: Negative.         The following portions of the patient's history were reviewed and updated as appropriate: allergies, current medications, past family history, past medical history, past social history, past surgical history and problem list.    Measures:   KPS/Quality of Life: 80 - Restricted Physical Activity      Objective     Physical Exam:   Vital Signs:   Vitals:    06/10/25 0840   BP: 102/63   Pulse: 69   Resp: 16   Temp: 98 °F (36.7 °C)   TempSrc: Oral   SpO2: 96%   Weight: 97.5 kg (215 lb)   Height: 170.2 cm (67.01\")   PainSc: 2    PainLoc: Comment: left axilla radiates to anterior and posterior ribs     Body mass index is 33.67 kg/m².     Constitutional: No acute distress, sitting comfortably  Eye: EOMI, anicteric sclerae  HENT: NC/AT, MMM   Respiratory: Symmetric expansion, nonlabored respiration  MSK: ROM intact in all four extremities, no obvious deformities  Neuro: Alert, oriented x3, CN3-12 grossly intact.   Psych: Appropriate mood and affect.  Chest wall: dry hyperpigmention involving L SCV field        Assessment / Plan        Assessment/Plan:     There are no diagnoses linked to this encounter.    Eliane Argueta is a pleasant 35 y.o. female with  a gr 3 +/-/- [id number] invasive ductal carcinoma of the left breast " managed with bilateral mastectomies and a left SLN eval/axillary LN excision of 2 additional nodes on 11/12/24. Fortunately margins were negative. She completed adjuvant chemo and adjuvant RT (50 Gy/25 fractions, completed 4/2025). She is recovering well from acute RT related toxicity. She has plans for delayed reconstruction in fall 2025. She will return to clinic in 1 year.      Follow Up:   Return in about 1 year (around 6/10/2026) for Office Visit.        Time:   I spent 25 minutes on this encounter today, 06/10/25. Activities that took place during this time include:   - preparing to see the patient  - obtaining and reviewing separately obtained history  - performing a medically appropriate examination and evaluation  - counseling and educating the patient  - ordering medications/tests/procedures  - communicating with other healthcare providers  - documenting clinical information in the health record  - coordinating care for this patient.     Sincerely,        Sobia Sherwood MD  Radiation Oncology  This document has been signed by Sobia Sherwood MD on Yola 10, 2025 09:32 EDT           NOTICE TO PATIENTS  At [facility], we believe that sharing information builds trust and better relationships. You are receiving this note because you recently visited [facility]. It is possible you will see health information before a provider has talked with you about it. This kind of information can be easy to misunderstand. To help you fully understand what it means for your health, we urge you to discuss this note with your provider.

## 2025-06-11 ENCOUNTER — TRANSCRIBE ORDERS (OUTPATIENT)
Dept: PHYSICAL THERAPY | Facility: HOSPITAL | Age: 35
End: 2025-06-11
Payer: MEDICAID

## 2025-06-11 DIAGNOSIS — I97.2 POSTMASTECTOMY LYMPHEDEMA SYNDROME: ICD-10-CM

## 2025-06-11 DIAGNOSIS — C50.212 MALIGNANT NEOPLASM OF UPPER-INNER QUADRANT OF LEFT FEMALE BREAST, UNSPECIFIED ESTROGEN RECEPTOR STATUS: Primary | ICD-10-CM

## 2025-07-23 DIAGNOSIS — Z51.11 ENCOUNTER FOR ANTINEOPLASTIC CHEMOTHERAPY: ICD-10-CM

## 2025-07-23 DIAGNOSIS — C50.212 CARCINOMA OF UPPER-INNER QUADRANT OF FEMALE BREAST, LEFT: ICD-10-CM

## 2025-07-23 RX ORDER — LORATADINE 10 MG/1
10 TABLET ORAL DAILY
Qty: 30 TABLET | Refills: 5 | Status: SHIPPED | OUTPATIENT
Start: 2025-07-23

## 2025-08-06 ENCOUNTER — HOSPITAL ENCOUNTER (OUTPATIENT)
Dept: PHYSICAL THERAPY | Facility: HOSPITAL | Age: 35
Setting detail: THERAPIES SERIES
Discharge: HOME | End: 2025-08-06
Payer: MEDICAID

## 2025-08-06 ENCOUNTER — OFFICE VISIT (OUTPATIENT)
Dept: ONCOLOGY | Facility: CLINIC | Age: 35
End: 2025-08-06
Payer: MEDICAID

## 2025-08-06 ENCOUNTER — TRANSCRIBE ORDERS (OUTPATIENT)
Dept: PHYSICAL THERAPY | Facility: HOSPITAL | Age: 35
End: 2025-08-06
Payer: MEDICAID

## 2025-08-06 ENCOUNTER — INFUSION (OUTPATIENT)
Facility: HOSPITAL | Age: 35
End: 2025-08-06
Payer: MEDICAID

## 2025-08-06 VITALS
HEART RATE: 67 BPM | TEMPERATURE: 96.9 F | RESPIRATION RATE: 18 BRPM | WEIGHT: 219 LBS | DIASTOLIC BLOOD PRESSURE: 71 MMHG | OXYGEN SATURATION: 97 % | BODY MASS INDEX: 34.37 KG/M2 | SYSTOLIC BLOOD PRESSURE: 124 MMHG | HEIGHT: 67 IN

## 2025-08-06 DIAGNOSIS — C50.212 CARCINOMA OF UPPER-INNER QUADRANT OF FEMALE BREAST, LEFT: Primary | ICD-10-CM

## 2025-08-06 DIAGNOSIS — C50.212 MALIGNANT NEOPLASM OF UPPER-INNER QUADRANT OF LEFT FEMALE BREAST, UNSPECIFIED ESTROGEN RECEPTOR STATUS: ICD-10-CM

## 2025-08-06 DIAGNOSIS — C50.212 MALIGNANT NEOPLASM OF UPPER-INNER QUADRANT OF LEFT FEMALE BREAST, UNSPECIFIED ESTROGEN RECEPTOR STATUS: Primary | ICD-10-CM

## 2025-08-06 DIAGNOSIS — I89.8 OTHER SPECIFIED NONINFECTIVE DISORDERS OF LYMPHATIC VESSELS AND LYMPH NODES: Primary | ICD-10-CM

## 2025-08-06 DIAGNOSIS — Z17.0 MALIGNANT NEOPLASM OF LEFT BREAST IN FEMALE, ESTROGEN RECEPTOR POSITIVE, UNSPECIFIED SITE OF BREAST: ICD-10-CM

## 2025-08-06 DIAGNOSIS — Z79.811 AROMATASE INHIBITOR USE: ICD-10-CM

## 2025-08-06 DIAGNOSIS — C50.912 MALIGNANT NEOPLASM OF LEFT BREAST IN FEMALE, ESTROGEN RECEPTOR POSITIVE, UNSPECIFIED SITE OF BREAST: ICD-10-CM

## 2025-08-06 LAB — ESTRADIOL SERPL HS-MCNC: <5 PG/ML

## 2025-08-06 PROCEDURE — 96402 CHEMO HORMON ANTINEOPL SQ/IM: CPT

## 2025-08-06 PROCEDURE — 1125F AMNT PAIN NOTED PAIN PRSNT: CPT | Performed by: INTERNAL MEDICINE

## 2025-08-06 PROCEDURE — 36415 COLL VENOUS BLD VENIPUNCTURE: CPT

## 2025-08-06 PROCEDURE — 97162 PT EVAL MOD COMPLEX 30 MIN: CPT

## 2025-08-06 PROCEDURE — 99214 OFFICE O/P EST MOD 30 MIN: CPT | Performed by: INTERNAL MEDICINE

## 2025-08-06 PROCEDURE — 25010000002 LEUPROLIDE 22.5 MG KIT: Performed by: INTERNAL MEDICINE

## 2025-08-06 PROCEDURE — 82670 ASSAY OF TOTAL ESTRADIOL: CPT

## 2025-08-06 RX ORDER — CITALOPRAM HYDROBROMIDE 10 MG/1
1 TABLET ORAL DAILY
COMMUNITY
Start: 2025-07-22

## 2025-08-06 RX ADMIN — LEUPROLIDE ACETATE 22.5 MG: 22.5 INJECTION, SUSPENSION, EXTENDED RELEASE SUBCUTANEOUS at 12:56

## (undated) DEVICE — JACKSON-PRATT 100CC BULB RESERVOIR: Brand: CARDINAL HEALTH

## (undated) DEVICE — BNDG,ELSTC,MATRIX,STRL,6"X5YD,LF,HOOK&LP: Brand: MEDLINE

## (undated) DEVICE — SHEATH GUIDE SCOUT SURG TPR 8.3TO3.2CM 264CM STRL

## (undated) DEVICE — ANTIBACTERIAL UNDYED BRAIDED (POLYGLACTIN 910), SYNTHETIC ABSORBABLE SUTURE: Brand: COATED VICRYL

## (undated) DEVICE — SYS CLS SKIN PREMIERPRO EXOFINFUSION 22CM

## (undated) DEVICE — HDRST PAD EA/20PC

## (undated) DEVICE — LEX GENERAL BREAST: Brand: MEDLINE INDUSTRIES, INC.

## (undated) DEVICE — DRSNG PAD ABD 8X10IN STRL

## (undated) DEVICE — SUT SILK 2/0 PS 18IN 1588H

## (undated) DEVICE — GLV SURG SENSICARE PI ORTHO SZ7.5 LF STRL

## (undated) DEVICE — CVR HNDL LIGHT RIGID

## (undated) DEVICE — SUT SILK 3/0 TIES 18IN A184H

## (undated) DEVICE — APPL CHLORAPREP TINTED 26ML TEAL

## (undated) DEVICE — GOWN,NON-REINFORCED,SIRUS,SET IN SLV,XL: Brand: MEDLINE

## (undated) DEVICE — DISH PETRI 3.5IN MD STRL LF

## (undated) DEVICE — DRAIN JACKSON PRATT ROUND 15FR: Brand: CARDINAL HEALTH

## (undated) DEVICE — SPNG LAP PREWSH SFTPK 18X18IN STRL PK/5

## (undated) DEVICE — TRAP FLD MINIVAC MEGADYNE 100ML

## (undated) DEVICE — ELECTRD BLD EZ CLN MOD XLNG 2.75IN